# Patient Record
Sex: FEMALE | Race: BLACK OR AFRICAN AMERICAN | Employment: FULL TIME | URBAN - METROPOLITAN AREA
[De-identification: names, ages, dates, MRNs, and addresses within clinical notes are randomized per-mention and may not be internally consistent; named-entity substitution may affect disease eponyms.]

---
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
## 2019-03-20 ENCOUNTER — OFFICE VISIT (OUTPATIENT)
Dept: FAMILY MEDICINE | Facility: CLINIC | Age: 19
End: 2019-03-20

## 2019-03-20 VITALS
HEART RATE: 94 BPM | BODY MASS INDEX: 28.14 KG/M2 | SYSTOLIC BLOOD PRESSURE: 115 MMHG | HEIGHT: 69 IN | OXYGEN SATURATION: 98 % | WEIGHT: 190 LBS | TEMPERATURE: 97.8 F | DIASTOLIC BLOOD PRESSURE: 77 MMHG

## 2019-03-20 DIAGNOSIS — S39.012A STRAIN OF LUMBAR REGION, INITIAL ENCOUNTER: Primary | ICD-10-CM

## 2019-03-20 DIAGNOSIS — M62.830 BACK MUSCLE SPASM: ICD-10-CM

## 2019-03-20 PROCEDURE — 99214 OFFICE O/P EST MOD 30 MIN: CPT | Performed by: PREVENTIVE MEDICINE

## 2019-03-20 RX ORDER — PREDNISONE 20 MG/1
20 TABLET ORAL 2 TIMES DAILY
Qty: 10 TABLET | Refills: 0 | Status: SHIPPED | OUTPATIENT
Start: 2019-03-20 | End: 2019-03-25

## 2019-03-20 RX ORDER — METHOCARBAMOL 500 MG/1
500 TABLET, FILM COATED ORAL 4 TIMES DAILY PRN
Qty: 30 TABLET | Refills: 0 | Status: SHIPPED | OUTPATIENT
Start: 2019-03-20 | End: 2020-05-27

## 2019-03-20 ASSESSMENT — PAIN SCALES - GENERAL: PAINLEVEL: EXTREME PAIN (8)

## 2019-03-20 ASSESSMENT — MIFFLIN-ST. JEOR: SCORE: 1688.27

## 2019-03-20 NOTE — PROGRESS NOTES
SUBJECTIVE:   Lubna Woods is a 18 year old female who presents to clinic today for the following health issues:    Patient states this is a Work related injury    Back Pain       Duration: 1 day ago, 3/19/19        Specific cause: lifting    Description:   Location of pain: low back Middle  Character of pain: sharp, dull ache and constant  Pain radiation:radiates into the right leg and radiates into the left leg  New numbness or weakness in legs, not attributed to pain:  no     Intensity: Currently 8/10    History:   Pain interferes with job: YES,   History of back problems: no prior back problems  Any previous MRI or X-rays: None  Sees a specialist for back pain:  No  Therapies tried without relief: IBU and heat    Alleviating factors:   Improved by: none      Precipitating factors:  Worsened by: Lifting, Bending, Standing, Sitting, Lying Flat, Walking and Coughing    Accompanying Signs & Symptoms:  Risk of Fracture:  None  Risk of Cauda Equina:  None  Risk of Infection:  None  Risk of Cancer:  None  Risk of Ankylosing Spondylitis:  Onset at age <35, male, AND morning back stiffness. no       Last night pain started while at work   Was lifting a bucket off a cart and felt sudden pain  Works in maintenance  Was able to ambulate   More pain with standing   The patient does not have any focal weakness or numbness, no urine or stool incontinence, no hematuria, no saddle anesthesia and no gait abnormalities.     Problem list and histories reviewed & adjusted, as indicated.  Additional history: as documented    Patient Active Problem List   Diagnosis     Dysmenorrhea     Marijuana abuse     Past Surgical History:   Procedure Laterality Date     NO HISTORY OF SURGERY         Social History     Tobacco Use     Smoking status: Never Smoker     Smokeless tobacco: Never Used   Substance Use Topics     Alcohol use: No     Family History   Problem Relation Age of Onset     Asthma Mother      Allergies Mother       "Hypertension Father      Diabetes Father      Unknown/Adopted Maternal Grandfather      Diabetes Paternal Grandmother      Hypertension Paternal Grandmother      Unknown/Adopted Paternal Grandfather          Current Outpatient Medications   Medication Sig Dispense Refill     methocarbamol (ROBAXIN) 500 MG tablet Take 1 tablet (500 mg) by mouth 4 times daily as needed for muscle spasms 30 tablet 0     predniSONE (DELTASONE) 20 MG tablet Take 20 mg by mouth 2 times daily for 5 days. 10 tablet 0     No Known Allergies  BP Readings from Last 3 Encounters:   03/20/19 115/77   07/31/17 115/72   05/11/16 118/60 (72 %/ 19 %)*     *BP percentiles are based on the August 2017 AAP Clinical Practice Guideline for girls    Wt Readings from Last 3 Encounters:   03/20/19 86.2 kg (190 lb)   07/31/17 70.8 kg (156 lb) (86 %)*   05/11/16 68.1 kg (150 lb 3.2 oz) (85 %)*     * Growth percentiles are based on [facility] (Girls, 2-20 Years) data.                  Labs reviewed in EPIC    Reviewed and updated as needed this visit by clinical staff  Tobacco  Allergies  Meds  Problems  Med Hx  Surg Hx  Fam Hx  Soc Hx        Reviewed and updated as needed this visit by Provider  Tobacco  Allergies  Meds  Problems  Med Hx  Surg Hx  Fam Hx         ROS:  Constitutional, neuro, ENT, endocrine, pulmonary, cardiac, gastrointestinal, genitourinary, musculoskeletal, integument and psychiatric systems are negative, except as otherwise noted.    OBJECTIVE:                                                    /77 (BP Location: Left arm, Patient Position: Chair, Cuff Size: Adult Large)   Pulse 94   Temp 97.8  F (36.6  C) (Oral)   Ht 1.74 m (5' 8.5\")   Wt 86.2 kg (190 lb)   SpO2 98%   BMI 28.47 kg/m    Body mass index is 28.47 kg/m .      GENERAL APPEARANCE: healthy, alert  EYES: Eyes grossly normal to inspection and conjunctivae and sclerae normal  HENT: nose and mouth without ulcers or lesions  NECK: no adenopathy and trachea midline " and normal to palpation  RESP: lungs clear to auscultation - no rales, rhonchi or wheezes  CV: regular rates and rhythm, normal S1 S2, no S3 or S4 and no murmur, click or rub  ABDOMEN: soft, non-tender and no rebound or guarding   MS: extremities normal- no gross deformities noted and peripheral pulses normal  SKIN: no suspicious lesions or rashes  NEURO: Normal strength and tone, mentation intact and speech normal  PSYCH: mentation appears normal      Lumbar spine: No swelling, bruising, erythema atrophy or deformity.  Tenderness noted on palpation of spinous processes+.  Range of motion of the lumbar spine is reduced in all directions without focal deficit.  Strength is full.  SLR negative bilaterally.  Distal motor and sensory exam is intact.  Reflexes are 2+ and symmetrical.  Distal pulses intact. Some sacroiliac tenderness bilaterally.  Great toe extension normal.     Diagnostic test results:  Diagnostic Test Results:  No results found for this or any previous visit (from the past 24 hour(s)).     ASSESSMENT/PLAN:                                                    1. Strain of lumbar region, initial encounter  - predniSONE (DELTASONE) 20 MG tablet; Take 20 mg by mouth 2 times daily for 5 days.  Dispense: 10 tablet; Refill: 0  - methocarbamol (ROBAXIN) 500 MG tablet; Take 1 tablet (500 mg) by mouth 4 times daily as needed for muscle spasms  Dispense: 30 tablet; Refill: 0  - [facility]; Future    2. Back muscle spasm  -Work note provided  -sedation side effect of medication reviewed   - methocarbamol (ROBAXIN) 500 MG tablet; Take 1 tablet (500 mg) by mouth 4 times daily as needed for muscle spasms  Dispense: 30 tablet; Refill: 0  - [facility]; Future      Follow up with Provider - 3 days    Will defer Health Maintenance as this is a work related injury    Adele Storm MD MPH    [facility]

## 2019-03-20 NOTE — PATIENT INSTRUCTIONS
================================================================================  Normal Values   Blood pressure  <140/90 for most adults    <130/80 for some chronic diseases (ask your care team about yours)    BMI (body mass index)  18.5-25 kg/m2 (based on height and weight)     Thank you for visiting [facility]    Normal or non-critical lab and imaging results will be communicated to you by MyChart, letter or phone within 7 days.  If you do not hear from us within 10 days, please call the clinic. If you have a critical or abnormal lab result, we will notify you by phone as soon as possible.     If you have any questions regarding your visit please contact:     Team Comfort:   Clinic Hours Telephone Number   Dr. Jude Parr Dr. [place] 7am-5pm  Monday - Friday [phone]  Deon RN  Ivania RN  Shira RN   Pharmacy 8:00am-8pm Monday-Friday    9am-5pm Saturday-Sunday [phone]   Urgent Care 11am-9pm Monday-Friday        9am-5pm Saturday-Sunday [phone]     After hours, weekend or if you need to make an appointment with your primary provider please call [phone].   After Hours nurse advise: call [facility]: [phone]    Medication Refills:  Call your pharmacy and they will forward the refill to us. Please allow 3 business days for your refills to be completed.

## 2019-03-20 NOTE — LETTER
March 20, 2019      Lubna Woods  [address]  [place] [address]        To Whom It May Concern:    Lubna Woods  was seen on 3/20/19.  Please excuse her until 3/23/19 due to injury.        Sincerely,        Adele Storm MD MPH

## 2020-05-06 NOTE — PROGRESS NOTES
"Subjective     Lubna Woods is a 19 year old female who presents to clinic today for the following health issues:    HPI     Vaginal Symptoms      Duration: 2-3 weeks    Description  vaginal discharge and itching    Intensity:  mild    Accompanying signs and symptoms (fever/dysuria/abdominal or back pain): None    History  Sexually active: yes, single partner, contraception - none  Possibility of pregnancy: Don't Know  Recent antibiotic use: no     Precipitating or alleviating factors: None    Therapies tried and outcome: Monistat, Azo   Outcome: Monistat didn't help, Azo helped temporarily    1 time sex with male partner 6 weeks ago. Had regular female partner prior to this.  Did take pregnancy test yesterday and was negative.  Has had some spotting in the last week.    Recent suicide attempt/hospitalization. Tried to overdose on Tylenol. Hospitalized mid-March for 24 hours. No suicidal ideation since then. Doesn't like hospitals and doesn't want to go back. No therapist and mental health provider.      Reviewed and updated as needed this visit by Provider         Review of Systems   ROS COMP: Constitutional, HEENT, cardiovascular, pulmonary, GI, , musculoskeletal, neuro, skin, endocrine and psych systems are negative, except as otherwise noted.      Objective    /70 (BP Location: Right arm, Patient Position: Chair, Cuff Size: Adult Regular)   Pulse 63   Temp 98.5  F (36.9  C) (Oral)   Ht 1.759 m (5' 9.25\")   Wt 76.2 kg (168 lb)   LMP 04/07/2020   SpO2 100%   BMI 24.63 kg/m    Body mass index is 24.63 kg/m .  Physical Exam   GENERAL: healthy, alert and no distress  ABDOMEN: soft, nontender, no hepatosplenomegaly, no masses and bowel sounds normal  PSYCH: mentation appears normal, affect normal/bright    Diagnostic Test Results:  Labs reviewed in Epic  Results for orders placed or performed in visit on 05/07/20 (from the past 24 hour(s))   *UA reflex to Microscopic and Culture ([facility] (except [place] and [place])    Specimen: Midstream Urine   Result Value Ref [facility]    Color Urine Yellow     Appearance Urine Clear     Glucose Urine Negative NEG^Negative mg/dL    Bilirubin Urine Negative NEG^Negative    Ketones Urine Negative NEG^Negative mg/dL    Specific Gravity Urine >1.030 1.003 - 1.035    Blood Urine Trace (A) NEG^Negative    pH Urine 6.5 5.0 - 7.0 pH    Protein Albumin Urine Trace (A) NEG^Negative mg/dL    Urobilinogen Urine 0.2 0.2 - 1.0 EU/dL    Nitrite Urine Negative NEG^Negative    Leukocyte Esterase Urine Large (A) NEG^Negative    Source Midstream Urine    Wet prep    Specimen: Vagina   Result Value Ref [facility]    Specimen Description Vagina     Wet Prep No Trichomonas seen     Wet Prep No yeast seen     Wet Prep No clue cells seen     Wet Prep WBC'S seen     Wet Prep Moderate    HCG Qual, Urine ([id number])   Result Value Ref [facility]    HCG Qual Urine Negative NEG^Negative   Urine Microscopic   Result Value Ref [facility]    WBC Urine 25-50 (A) OTO5^0 - 5 /HPF    RBC Urine 10-25 (A) OTO2^O - 2 /HPF    Squamous Epithelial /LPF Urine Few FEW^Few /LPF    Bacteria Urine Few (A) NEG^Negative /HPF           Assessment & Plan     1. Acute cystitis without hematuria  Start Macrobid, push fluids.   - nitroFURantoin macrocrystal-monohydrate (MACROBID) 100 MG capsule; Take 1 capsule (100 mg) by mouth 2 times daily for 5 days  Dispense: 10 capsule; Refill: 0    2. Unprotected sex  Advised to always use condoms. Not intrerested in contraception as does not plan to be sexually active in near future  - *UA reflex to Microscopic and Culture ([facility] [place] [facility] (except [place] and [place])  - Wet prep  - HCG Qual, Urine ([id number])  - Neisseria gonorrhoeae PCR  - Chlamydia trachomatis PCR  - Urine Microscopic    3. Suicide attempt (H)  Referred to therapy and psychiatry for ongoing management  - MENTAL HEALTH REFERRAL  - Adult; Outpatient Treatment, Psychiatry; Individual/Couples/Family/Group  Therapy/Health Psychology; [facility]: [facility] [phone]; We will contact you to schedule the appointment or please call with any ...    4. Nonspecific finding on examination of urine    - Urine Culture Aerobic Bacterial       See Patient Instructions    Return in about 3 months (around 8/7/2020) for Physical with Pap.    SHEA Vasquez [facility]

## 2020-05-07 ENCOUNTER — OFFICE VISIT (OUTPATIENT)
Dept: FAMILY MEDICINE | Facility: CLINIC | Age: 20
End: 2020-05-07
Payer: COMMERCIAL

## 2020-05-07 VITALS
TEMPERATURE: 98.5 F | HEIGHT: 69 IN | SYSTOLIC BLOOD PRESSURE: 112 MMHG | WEIGHT: 168 LBS | BODY MASS INDEX: 24.88 KG/M2 | DIASTOLIC BLOOD PRESSURE: 70 MMHG | OXYGEN SATURATION: 100 % | HEART RATE: 63 BPM

## 2020-05-07 DIAGNOSIS — Z72.51 UNPROTECTED SEX: ICD-10-CM

## 2020-05-07 DIAGNOSIS — R82.90 NONSPECIFIC FINDING ON EXAMINATION OF URINE: ICD-10-CM

## 2020-05-07 DIAGNOSIS — T14.91XA SUICIDE ATTEMPT (H): ICD-10-CM

## 2020-05-07 DIAGNOSIS — N30.00 ACUTE CYSTITIS WITHOUT HEMATURIA: Primary | ICD-10-CM

## 2020-05-07 LAB
ALBUMIN UR-MCNC: ABNORMAL MG/DL
APPEARANCE UR: CLEAR
BACTERIA #/AREA URNS HPF: ABNORMAL /HPF
BILIRUB UR QL STRIP: NEGATIVE
COLOR UR AUTO: YELLOW
GLUCOSE UR STRIP-MCNC: NEGATIVE MG/DL
HCG UR QL: NEGATIVE
HGB UR QL STRIP: ABNORMAL
KETONES UR STRIP-MCNC: NEGATIVE MG/DL
LEUKOCYTE ESTERASE UR QL STRIP: ABNORMAL
NITRATE UR QL: NEGATIVE
NON-SQ EPI CELLS #/AREA URNS LPF: ABNORMAL /LPF
PH UR STRIP: 6.5 PH (ref 5–7)
RBC #/AREA URNS AUTO: ABNORMAL /HPF
SOURCE: ABNORMAL
SP GR UR STRIP: >1.03 (ref 1–1.03)
SPECIMEN SOURCE: NORMAL
UROBILINOGEN UR STRIP-ACNC: 0.2 EU/DL (ref 0.2–1)
WBC #/AREA URNS AUTO: ABNORMAL /HPF
WET PREP SPEC: NORMAL

## 2020-05-07 PROCEDURE — 87088 URINE BACTERIA CULTURE: CPT | Performed by: NURSE PRACTITIONER

## 2020-05-07 PROCEDURE — 87086 URINE CULTURE/COLONY COUNT: CPT | Performed by: NURSE PRACTITIONER

## 2020-05-07 PROCEDURE — 87591 N.GONORRHOEAE DNA AMP PROB: CPT | Performed by: NURSE PRACTITIONER

## 2020-05-07 PROCEDURE — 87491 CHLMYD TRACH DNA AMP PROBE: CPT | Performed by: NURSE PRACTITIONER

## 2020-05-07 PROCEDURE — 81025 URINE PREGNANCY TEST: CPT | Performed by: NURSE PRACTITIONER

## 2020-05-07 PROCEDURE — 81001 URINALYSIS AUTO W/SCOPE: CPT | Performed by: NURSE PRACTITIONER

## 2020-05-07 PROCEDURE — 87210 SMEAR WET MOUNT SALINE/INK: CPT | Performed by: NURSE PRACTITIONER

## 2020-05-07 PROCEDURE — 99214 OFFICE O/P EST MOD 30 MIN: CPT | Performed by: NURSE PRACTITIONER

## 2020-05-07 RX ORDER — NITROFURANTOIN 25; 75 MG/1; MG/1
100 CAPSULE ORAL 2 TIMES DAILY
Qty: 10 CAPSULE | Refills: 0 | Status: SHIPPED | OUTPATIENT
Start: 2020-05-07 | End: 2020-05-08 | Stop reason: ALTCHOICE

## 2020-05-07 ASSESSMENT — MIFFLIN-ST. JEOR: SCORE: 1595.38

## 2020-05-08 LAB
BACTERIA SPEC CULT: ABNORMAL
C TRACH DNA SPEC QL NAA+PROBE: NEGATIVE
N GONORRHOEA DNA SPEC QL NAA+PROBE: NEGATIVE
SPECIMEN SOURCE: ABNORMAL
SPECIMEN SOURCE: NORMAL
SPECIMEN SOURCE: NORMAL

## 2020-05-08 RX ORDER — PENICILLIN V POTASSIUM 500 MG/1
500 TABLET, FILM COATED ORAL 2 TIMES DAILY
Qty: 10 TABLET | Refills: 0 | Status: SHIPPED | OUTPATIENT
Start: 2020-05-08 | End: 2020-05-27

## 2020-05-08 NOTE — RESULT ENCOUNTER NOTE
Please call the patient with these results:    -Urine culture is abnormal and grew out bacteria that are NOT sensitive to the antibiotic you have been given.   I have sent a new antibiotic to your pharmacy to replace the previous antibiotic.   Pia Topete, CNP

## 2020-05-14 ENCOUNTER — VIRTUAL VISIT (OUTPATIENT)
Dept: PSYCHOLOGY | Facility: CLINIC | Age: 20
End: 2020-05-14
Attending: NURSE PRACTITIONER
Payer: COMMERCIAL

## 2020-05-14 DIAGNOSIS — F41.8 OTHER SPECIFIED ANXIETY DISORDERS: Primary | ICD-10-CM

## 2020-05-14 DIAGNOSIS — F32.89 OTHER SPECIFIED DEPRESSIVE EPISODES: ICD-10-CM

## 2020-05-14 PROCEDURE — 90791 PSYCH DIAGNOSTIC EVALUATION: CPT | Mod: 95 | Performed by: SOCIAL WORKER

## 2020-05-14 ASSESSMENT — COLUMBIA-SUICIDE SEVERITY RATING SCALE - C-SSRS
ATTEMPT LIFETIME: YES
5. HAVE YOU STARTED TO WORK OUT OR WORKED OUT THE DETAILS OF HOW TO KILL YOURSELF? DO YOU INTEND TO CARRY OUT THIS PLAN?: YES
1. IN THE PAST MONTH, HAVE YOU WISHED YOU WERE DEAD OR WISHED YOU COULD GO TO SLEEP AND NOT WAKE UP?: NO
REASONS FOR IDEATION LIFETIME: COMPLETELY TO END OR STOP THE PAIN (YOU COULDN'T GO ON LIVING WITH THE PAIN OR HOW YOU WERE FEELING)
LETHALITY/MEDICAL DAMAGE CODE MOST RECENT ACTUAL ATTEMPT: NO PHYSICAL DAMAGE OR VERY MINOR PHYSICAL DAMAGE
6. HAVE YOU EVER DONE ANYTHING, STARTED TO DO ANYTHING, OR PREPARED TO DO ANYTHING TO END YOUR LIFE?: YES
TOTAL  NUMBER OF ABORTED OR SELF INTERRUPTED ATTEMPTS PAST LIFETIME: NO
TOTAL  NUMBER OF INTERRUPTED ATTEMPTS LIFETIME: YES
4. HAVE YOU HAD THESE THOUGHTS AND HAD SOME INTENTION OF ACTING ON THEM?: NO
3. HAVE YOU BEEN THINKING ABOUT HOW YOU MIGHT KILL YOURSELF?: YES
5. HAVE YOU STARTED TO WORK OUT OR WORKED OUT THE DETAILS OF HOW TO KILL YOURSELF? DO YOU INTEND TO CARRY OUT THIS PLAN?: NO
2. HAVE YOU ACTUALLY HAD ANY THOUGHTS OF KILLING YOURSELF LIFETIME?: YES
1. IN THE PAST MONTH, HAVE YOU WISHED YOU WERE DEAD OR WISHED YOU COULD GO TO SLEEP AND NOT WAKE UP?: YES
ATTEMPT PAST THREE MONTHS: YES
4. HAVE YOU HAD THESE THOUGHTS AND HAD SOME INTENTION OF ACTING ON THEM?: YES
6. HAVE YOU EVER DONE ANYTHING, STARTED TO DO ANYTHING, OR PREPARED TO DO ANYTHING TO END YOUR LIFE?: YES

## 2020-05-14 ASSESSMENT — PATIENT HEALTH QUESTIONNAIRE - PHQ9: SUM OF ALL RESPONSES TO PHQ QUESTIONS 1-9: 8

## 2020-05-14 ASSESSMENT — ANXIETY QUESTIONNAIRES
5. BEING SO RESTLESS THAT IT IS HARD TO SIT STILL: NOT AT ALL
IF YOU CHECKED OFF ANY PROBLEMS ON THIS QUESTIONNAIRE, HOW DIFFICULT HAVE THESE PROBLEMS MADE IT FOR YOU TO DO YOUR WORK, TAKE CARE OF THINGS AT HOME, OR GET ALONG WITH OTHER PEOPLE: VERY DIFFICULT
4. TROUBLE RELAXING: SEVERAL DAYS
1. FEELING NERVOUS, ANXIOUS, OR ON EDGE: MORE THAN HALF THE DAYS
7. FEELING AFRAID AS IF SOMETHING AWFUL MIGHT HAPPEN: MORE THAN HALF THE DAYS
6. BECOMING EASILY ANNOYED OR IRRITABLE: SEVERAL DAYS
2. NOT BEING ABLE TO STOP OR CONTROL WORRYING: NEARLY EVERY DAY
GAD7 TOTAL SCORE: 12
3. WORRYING TOO MUCH ABOUT DIFFERENT THINGS: NEARLY EVERY DAY

## 2020-05-14 NOTE — Clinical Note
Hello,  I am just passing this along to inform you this patient began therapy with me today. Feel free to reach out with any questions or thoughts to coordinate care.  Thanks! Daniela
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
## 2020-05-15 ASSESSMENT — ANXIETY QUESTIONNAIRES: GAD7 TOTAL SCORE: 12

## 2020-05-25 ENCOUNTER — NURSE TRIAGE (OUTPATIENT)
Dept: NURSING | Facility: CLINIC | Age: 20
End: 2020-05-25
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
## 2020-05-26 ENCOUNTER — VIRTUAL VISIT (OUTPATIENT)
Dept: FAMILY MEDICINE | Facility: CLINIC | Age: 20
End: 2020-05-26
Payer: COMMERCIAL

## 2020-05-26 DIAGNOSIS — R21 VULVAR RASH: Primary | ICD-10-CM

## 2020-05-26 PROCEDURE — 99207 ZZC NON-BILLABLE SERV PER CHARTING: CPT | Performed by: PHYSICIAN ASSISTANT

## 2020-05-26 NOTE — PROGRESS NOTES
Subjective     Lubna Woods is a 19 year old female who presents to clinic today for the following health issues:    HPI   Rash      Duration: 3-4 weeks    Description  Location: vaginal  Itching: irritated    Intensity:  mild    Accompanying signs and symptoms: vaginal order    History (similar episodes/previous evaluation): was seen 5/7/2020    Precipitating or alleviating factors:  New exposures:  None  Recent travel: no      Therapies tried and outcome: was on antibiotic       Patient Active Problem List   Diagnosis     Dysmenorrhea     Marijuana abuse     Suicide attempt (H)     Past Surgical History:   Procedure Laterality Date     NO HISTORY OF SURGERY         Social History     Tobacco Use     Smoking status: Never Smoker     Smokeless tobacco: Never Used   Substance Use Topics     Alcohol use: No     Family History   Problem Relation Age of Onset     Asthma Mother      Allergies Mother      Hypertension Father      Diabetes Father      Unknown/Adopted Maternal Grandfather      Diabetes Paternal Grandmother      Hypertension Paternal Grandmother      Unknown/Adopted Paternal Grandfather          Current Outpatient Medications   Medication Sig Dispense Refill     ibuprofen (ADVIL/MOTRIN) 400 MG tablet Take 1 tablet (400 mg) by mouth every 6 hours as needed for moderate pain 30 tablet 2     metroNIDAZOLE (FLAGYL) 500 MG tablet Take 1 tablet (500 mg) by mouth 2 times daily for 7 days 14 tablet 0     No Known Allergies  BP Readings from Last 3 Encounters:   05/27/20 114/72   05/07/20 112/70   03/20/19 115/77    Wt Readings from Last 3 Encounters:   05/27/20 75.3 kg (166 lb)   05/07/20 76.2 kg (168 lb)   03/20/19 86.2 kg (190 lb)                      Reviewed and updated as needed this visit by Provider         Review of Systems   Constitutional, HEENT, cardiovascular, pulmonary, gi and gu systems are negative, except as otherwise noted.      Objective    There were no vitals taken for this visit.  There is no  height or weight on file to calculate BMI.  Physical Exam   GENERAL: healthy, alert and no distress   (female): normal female external genitalia, normal urethral meatus , vaginal discharge - moderate, brown and watery and normal cervix, adnexae, and uterus without masses.  MS: no gross musculoskeletal defects noted, no edema    Diagnostic Test Results:  Results for orders placed or performed in visit on 05/27/20 (from the past 24 hour(s))   Wet prep    Specimen: Vagina   Result Value Ref Range    Specimen Description Vagina     Wet Prep Trichomonas seen (A)     Wet Prep No clue cells seen     Wet Prep No yeast seen     Wet Prep Few  WBC'S seen              Assessment & Plan     1. Vaginal discharge    - Wet prep    2. Trichomonas infection  Patient to notify her partner.  - metroNIDAZOLE (FLAGYL) 500 MG tablet; Take 1 tablet (500 mg) by mouth 2 times daily for 7 days  Dispense: 14 tablet; Refill: 0    3. Dysmenorrhea  Patient requests ibuprofen for her menstrual cramps.  - ibuprofen (ADVIL/MOTRIN) 400 MG tablet; Take 1 tablet (400 mg) by mouth every 6 hours as needed for moderate pain  Dispense: 30 tablet; Refill: 2           Return in about 3 months (around 8/27/2020) for Physical Exam, pap.    SHEA Alonzo [facility]

## 2020-05-26 NOTE — PROGRESS NOTES
"Lubna Woods is a 19 year old female who is being evaluated via a billable video visit.      The patient has been notified of following:     \"This video visit will be conducted via a call between you and your physician/provider. We have found that certain health care needs can be provided without the need for an in-person physical exam.  This service lets us provide the care you need with a video conversation.  If a prescription is necessary we can send it directly to your pharmacy.  If lab work is needed we can place an order for that and you can then stop by our lab to have the test done at a later time.    Video visits are billed at different rates depending on your insurance coverage.  Please reach out to your insurance provider with any questions.    If during the course of the call the physician/provider feels a video visit is not appropriate, you will not be charged for this service.\"    Patient has given verbal consent for Video visit? Yes    How would you like to obtain your AVS? Mail a copy    Patient would like the video invitation sent by: Send to e-mail at: [email or web address]    Will anyone else be joining your video visit? No      Subjective     Lubna Woods is a 19 year old female who presents today via video visit for the following health issues:    HPI   Patient was seen by Pia Topete in office on 5/7/2020 she was prescribed penicillin she states, \"the medication is gone, but the symptoms are still present.\"       Reviewed labs with Patient. She now has noticed a rash on her perineum.  No h/o HSV.  She is amenablento in clinic follow up.   Review of Systems         Video-Visit Details    Type of service:  Video Visit    Video  Time:5 minutes    Originating Location (pt. Location): Home    Distant Location (provider location):  [facility]     Platform used for Video Visit: Kathi Martinez PA-C      "

## 2020-05-27 ENCOUNTER — OFFICE VISIT (OUTPATIENT)
Dept: FAMILY MEDICINE | Facility: CLINIC | Age: 20
End: 2020-05-27
Payer: COMMERCIAL

## 2020-05-27 VITALS
RESPIRATION RATE: 14 BRPM | WEIGHT: 166 LBS | HEART RATE: 58 BPM | DIASTOLIC BLOOD PRESSURE: 72 MMHG | BODY MASS INDEX: 24.34 KG/M2 | TEMPERATURE: 98.5 F | SYSTOLIC BLOOD PRESSURE: 114 MMHG | OXYGEN SATURATION: 100 %

## 2020-05-27 DIAGNOSIS — N89.8 VAGINAL DISCHARGE: Primary | ICD-10-CM

## 2020-05-27 DIAGNOSIS — A59.9 TRICHOMONAS INFECTION: ICD-10-CM

## 2020-05-27 DIAGNOSIS — N94.6 DYSMENORRHEA: ICD-10-CM

## 2020-05-27 LAB
SPECIMEN SOURCE: ABNORMAL
WET PREP SPEC: ABNORMAL

## 2020-05-27 PROCEDURE — 87210 SMEAR WET MOUNT SALINE/INK: CPT | Performed by: NURSE PRACTITIONER

## 2020-05-27 PROCEDURE — 99213 OFFICE O/P EST LOW 20 MIN: CPT | Performed by: NURSE PRACTITIONER

## 2020-05-27 RX ORDER — METRONIDAZOLE 500 MG/1
500 TABLET ORAL 2 TIMES DAILY
Qty: 14 TABLET | Refills: 0 | Status: SHIPPED | OUTPATIENT
Start: 2020-05-27 | End: 2020-06-30

## 2020-05-27 RX ORDER — IBUPROFEN 400 MG/1
400 TABLET, FILM COATED ORAL EVERY 6 HOURS PRN
Qty: 30 TABLET | Refills: 2 | Status: SHIPPED | OUTPATIENT
Start: 2020-05-27 | End: 2021-02-12

## 2020-06-05 ENCOUNTER — TELEPHONE (OUTPATIENT)
Dept: PSYCHOLOGY | Facility: CLINIC | Age: 20
End: 2020-06-05

## 2020-06-05 NOTE — TELEPHONE ENCOUNTER
Therapist reached out to client due to her not connecting to video visit and LVM informing her of efforts to connect for appointment. Therapist called back 5 minutes later and was able to speak to client. She stated that she is not feeling well and would like to cancel her appointment. She chose to re-schedule for a month out. She denied any serious mental health concerns or SI at this time and agreed to meet in one month.     JADYN Dill

## 2020-06-30 ENCOUNTER — OFFICE VISIT (OUTPATIENT)
Dept: FAMILY MEDICINE | Facility: CLINIC | Age: 20
End: 2020-06-30
Payer: COMMERCIAL

## 2020-06-30 ENCOUNTER — NURSE TRIAGE (OUTPATIENT)
Dept: FAMILY MEDICINE | Facility: CLINIC | Age: 20
End: 2020-06-30

## 2020-06-30 VITALS
SYSTOLIC BLOOD PRESSURE: 98 MMHG | WEIGHT: 167 LBS | DIASTOLIC BLOOD PRESSURE: 50 MMHG | RESPIRATION RATE: 14 BRPM | HEIGHT: 69 IN | OXYGEN SATURATION: 99 % | BODY MASS INDEX: 24.73 KG/M2 | HEART RATE: 68 BPM | TEMPERATURE: 97.1 F

## 2020-06-30 DIAGNOSIS — A59.9 TRICHOMONAS INFECTION: Primary | ICD-10-CM

## 2020-06-30 PROBLEM — T14.91XA SUICIDE ATTEMPT (H): Status: RESOLVED | Noted: 2020-05-07 | Resolved: 2020-06-30

## 2020-06-30 LAB
HIV 1+2 AB+HIV1 P24 AG SERPL QL IA: NONREACTIVE
SPECIMEN SOURCE: ABNORMAL
WET PREP SPEC: ABNORMAL

## 2020-06-30 PROCEDURE — 87491 CHLMYD TRACH DNA AMP PROBE: CPT | Performed by: FAMILY MEDICINE

## 2020-06-30 PROCEDURE — 99213 OFFICE O/P EST LOW 20 MIN: CPT | Performed by: FAMILY MEDICINE

## 2020-06-30 PROCEDURE — 87591 N.GONORRHOEAE DNA AMP PROB: CPT | Performed by: FAMILY MEDICINE

## 2020-06-30 PROCEDURE — 36415 COLL VENOUS BLD VENIPUNCTURE: CPT | Performed by: FAMILY MEDICINE

## 2020-06-30 PROCEDURE — 87210 SMEAR WET MOUNT SALINE/INK: CPT | Performed by: FAMILY MEDICINE

## 2020-06-30 PROCEDURE — 87389 HIV-1 AG W/HIV-1&-2 AB AG IA: CPT | Performed by: FAMILY MEDICINE

## 2020-06-30 RX ORDER — METRONIDAZOLE 500 MG/1
500 TABLET ORAL 2 TIMES DAILY
Qty: 14 TABLET | Refills: 0 | Status: SHIPPED | OUTPATIENT
Start: 2020-06-30 | End: 2020-07-15

## 2020-06-30 ASSESSMENT — MIFFLIN-ST. JEOR: SCORE: 1590.85

## 2020-06-30 NOTE — TELEPHONE ENCOUNTER
Spoke with pt. Symptoms improved, but didn't clear it up. Still has vaginal discharge that is light tan in color and is runny. Has an odor, but says she can't explain this. Is itchy from time to time. No abdominal pain. Pt is frustrated because this is the 3rd time she got this and it won't go away. States she has not had sex since she had it the last time. Recommended an appt. Appt scheduled.    Maria C Oconnell RN  [facility]

## 2020-06-30 NOTE — TELEPHONE ENCOUNTER
Reason for call:  Symptom   Symptom or request: STD follow up    Duration (how long have symptoms been present): 1 month  Have you been treated for this before? Yes    Additional comments: Patient is still having discharge and itching, please call.    Phone number to reach patient:  Cell number on file:    Telephone Information:   Mobile [phone]       Best Time:  any    Can we leave a detailed message on this number?  YES    Travel screening: Not Applicable

## 2020-06-30 NOTE — PATIENT INSTRUCTIONS
Patient Education     What Are Sexually Transmitted Diseases (STDs)?  A sexually transmitted disease (STD) is a disease that is spread during sex. (An STD can also be called STI for sexually transmitted infection.) You can become infected with an STD if you have sex with someone who has an STD. Any sex that involves the penis, vagina, anus, or mouth can spread disease. Some STDs spread through body fluids such as semen, vaginal fluid, or blood. Others spread through contact with affected skin.  Who is at risk?     Places on or in the body where STDs cause damage include reproductive organs, the rectum, and the mouth.   It doesn t matter if you re straight or machado, male or female, young or old. Any person who has sex can get an STD. Your risk increases if:    You have more than one partner. The more partners you have, the greater your risk.    Your partner has other partners. If your partner is exposed to an STD, you could be, too.    You or your partner have had sex with other people in the past. Either of you might be carrying an STD from an earlier partner.    You have an STD. The STD may cause sores or other health problems that increase your risk of new infections. Your risk will stay high unless you change the behaviors that put you at risk of the current infection.  Prevent future problems  Left untreated, certain STDs can lead to cancer or even death. Some can harm unborn babies whose mothers are infected. Others can cause you to not be able to have children (sterility) or can affect changes in behavior or your ability to think. You can prevent these problems with safer sex, regular checkups, and early treatment. Always use a latex condom when you have sex. Get tested if you re at risk. And get treated early if you have an STD.  Getting checked  The only sure way to know if you have an STD is to get checked by a healthcare provider. If you notice a change in how your body looks or feels, have it checked out.  But keep in mind, STDs don t always show symptoms. So if you re at risk of STDs, get checked regularly. If you find you have an STD, be sure your partner gets treatment, too. If not, his or her health is at risk. And left untreated, your partner could pass the STD back to you, or on to others.  Common symptoms  Be alert to any changes in your body and your partner s body. Symptoms may appear in or near the vagina, penis, rectum, mouth, or throat. They include:    Unusual discharge    Lumps, bumps, or rashes    Sores that may be painful, itchy, or painless    Itchy skin    Burning with urination    Pain in the pelvis, belly (abdomen), or rectum  Even if you don t have symptoms  You may have an STD, even if you don t have symptoms. If you think you are at risk, get checked. Go to a clinic or to your healthcare provider. If your partner has an STD, you need to be tested too, even if you feel fine.  Vaccines to prevent disease  Vaccines (also called immunizations) are available to prevent hepatitis A and hepatitis B. These are two kinds of STDs. There is also a vaccine to prevent HPV. This is a virus that can be passed from person to person through sexual contact. Ask your healthcare provider whether any of these vaccines is right for you.   Date Last Reviewed: 11/1/2016 2000-2019 [employer]. [address] [place] [address], [place] [address]. All rights reserved. This information is not intended as a substitute for professional medical care. Always follow your healthcare professional's instructions.

## 2020-06-30 NOTE — LETTER
July 2, 2020      Lubna Woods  [address]  [facility] [address]          Dear ,    We are writing to inform you of your test results.  Your HIV, gonorrhea, and Chlamydia tests are normal. As we dicussed in clinic, you have trichomonas. This has been treated. Call or return to clinic as needed if these symptoms worsen or fail to improve as anticipated.       Resulted Orders   Wet prep   Result Value Ref Range    Specimen Description Vagina     Wet Prep Trichomonas seen  Few   (A)     Wet Prep No clue cells seen     Wet Prep No yeast seen     Wet Prep Moderate  WBC'S seen      HIV Antigen Antibody Combo   Result Value Ref Range    HIV Antigen Antibody Combo Nonreactive NR^Nonreactive          Comment:      HIV-1 p24 Ag & HIV-1/HIV-2 Ab Not Detected   Neisseria gonorrhoeae PCR   Result Value Ref Range    Specimen Descrip Vagina     N Gonorrhea PCR Negative NEG^Negative      Comment:      Negative for N. gonorrhoeae rRNA by transcription mediated amplification.  A negative result by transcription mediated amplification does not preclude   the presence of N. gonorrhoeae infection because results are dependent on   proper and adequate collection, absence of inhibitors, and sufficient rRNA to   be detected.     Chlamydia trachomatis PCR   Result Value Ref Range    Specimen Description Vagina     Chlamydia Trachomatis PCR Negative NEG^Negative      Comment:      Negative for C. trachomatis rRNA by transcription mediated amplification.  A negative result by transcription mediated amplification does not preclude   the presence of C. trachomatis infection because results are dependent on   proper and adequate collection, absence of inhibitors, and sufficient rRNA to   be detected.         If you have any questions or concerns, please call the clinic at the number listed above.       Sincerely,        Whitley Segura MD

## 2020-06-30 NOTE — PROGRESS NOTES
"Ning Woods is a 19 year old female who presents to clinic today for the following health issues:    HPI   Vaginal Symptoms  Onset: Since March    Description:  Vaginal Discharge: creamy yellowish   Itching (Pruritis): YES- Slight  Burning sensation:  no   Odor: YES    Accompanying Signs & Symptoms:  Pain with Urination: no   Abdominal Pain: no   Fever: no     History:   Sexually active: no   New Partner: no   Possibility of Pregnancy:  No    Precipitating factors:   Recent Antibiotic Use: YES    Alleviating factors:      Therapies Tried and outcome: previous treatments for trichomonas         Reviewed and updated as needed this visit by Provider  Tobacco  Allergies  Meds  Problems  Med Hx  Surg Hx  Fam Hx         Review of Systems   CONSTITUTIONAL: NEGATIVE for fever, chills, change in weight  RESP: NEGATIVE for significant cough or SOB   female: vaginal discharge        Objective    BP 98/50   Pulse 68   Temp 97.1  F (36.2  C) (Oral)   Resp 14   Ht 1.759 m (5' 9.25\")   Wt 75.8 kg (167 lb)   LMP 06/02/2020   SpO2 99%   Breastfeeding No   BMI 24.48 kg/m    Body mass index is 24.48 kg/m .  Physical Exam   GENERAL: healthy, alert and no distress  MS: extremities normal- no gross deformities noted  PSYCH: mentation appears normal, affect normal/bright    Diagnostic Test Results:  Labs reviewed in Epic  Results for orders placed or performed in visit on 06/30/20 (from the past 24 hour(s))   Wet prep    Specimen: Vagina   Result Value Ref Range    Specimen Description Vagina     Wet Prep Trichomonas seen  Few   (A)     Wet Prep No clue cells seen     Wet Prep No yeast seen     Wet Prep Moderate  WBC'S seen              Assessment & Plan     (A59.9) Trichomonas infection  (primary encounter diagnosis)  Comment: persistent infection   Plan: HIV Antigen Antibody Combo, metroNIDAZOLE         (FLAGYL) 500 MG tablet, Neisseria gonorrhoeae         PCR, Chlamydia trachomatis PCR        " Discussed risks and benefits of this medication. Discussed safe sex practices and follow-up for AFE.          See Patient Instructions    Return in about 1 month (around 7/30/2020) for physical.    Whitley Segura MD  [facility]

## 2020-06-30 NOTE — TELEPHONE ENCOUNTER
Additional Information    Negative: Pain or burning with passing urine (urination) is main symptom    Negative: Pregnant with vaginal discharge    Negative: SEVERE abdominal pain (e.g., excruciating)    Negative: Patient sounds very sick or weak to the triager    Negative: Yellow or green vaginal discharge and has a fever    Negative: Constant abdominal pain lasting > 2 hours    Bad smelling vaginal discharge    Negative: Rash (e.g., redness, tiny bumps, sore) of genital area present > 24 hours    Negative: Mild lower abdominal pain comes and goes (cramps) that lasts > 24 hours    Negative: Genital area looks infected (e.g., draining sore, spreading redness)    Negative: Rash is tiny water blisters (3 or more)    Negative: Patient wants to be seen    Protocols used: VAGINAL [id number]

## 2020-07-01 LAB
C TRACH DNA SPEC QL NAA+PROBE: NEGATIVE
N GONORRHOEA DNA SPEC QL NAA+PROBE: NEGATIVE
SPECIMEN SOURCE: NORMAL
SPECIMEN SOURCE: NORMAL

## 2020-07-01 NOTE — RESULT ENCOUNTER NOTE
Mail letter:    Your HIV, gonorrhea, and Chlamydia tests are normal. As we dicussed in clinic, you have trichomonas. This has been treated. Call or return to clinic as needed if these symptoms worsen or fail to improve as anticipated.     Whitley Segura MD

## 2020-07-08 ENCOUNTER — VIRTUAL VISIT (OUTPATIENT)
Dept: PSYCHOLOGY | Facility: CLINIC | Age: 20
End: 2020-07-08
Payer: COMMERCIAL

## 2020-07-08 DIAGNOSIS — F32.89 OTHER SPECIFIED DEPRESSIVE EPISODES: ICD-10-CM

## 2020-07-08 DIAGNOSIS — F41.8 OTHER SPECIFIED ANXIETY DISORDERS: Primary | ICD-10-CM

## 2020-07-08 PROCEDURE — 90834 PSYTX W PT 45 MINUTES: CPT | Mod: 95 | Performed by: SOCIAL WORKER

## 2020-07-08 ASSESSMENT — ANXIETY QUESTIONNAIRES
GAD7 TOTAL SCORE: 12
3. WORRYING TOO MUCH ABOUT DIFFERENT THINGS: MORE THAN HALF THE DAYS
7. FEELING AFRAID AS IF SOMETHING AWFUL MIGHT HAPPEN: SEVERAL DAYS
5. BEING SO RESTLESS THAT IT IS HARD TO SIT STILL: NOT AT ALL
1. FEELING NERVOUS, ANXIOUS, OR ON EDGE: MORE THAN HALF THE DAYS
4. TROUBLE RELAXING: MORE THAN HALF THE DAYS
2. NOT BEING ABLE TO STOP OR CONTROL WORRYING: MORE THAN HALF THE DAYS
6. BECOMING EASILY ANNOYED OR IRRITABLE: NEARLY EVERY DAY

## 2020-07-08 ASSESSMENT — PATIENT HEALTH QUESTIONNAIRE - PHQ9: SUM OF ALL RESPONSES TO PHQ QUESTIONS 1-9: 8
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
## 2020-07-09 ASSESSMENT — ANXIETY QUESTIONNAIRES: GAD7 TOTAL SCORE: 12

## 2020-07-15 ENCOUNTER — TELEPHONE (OUTPATIENT)
Dept: FAMILY MEDICINE | Facility: CLINIC | Age: 20
End: 2020-07-15

## 2020-07-15 DIAGNOSIS — A59.9 TRICHOMONAS INFECTION: ICD-10-CM

## 2020-07-15 RX ORDER — METRONIDAZOLE 500 MG/1
500 TABLET ORAL 2 TIMES DAILY
Qty: 14 TABLET | Refills: 0 | Status: SHIPPED | OUTPATIENT
Start: 2020-07-15 | End: 2020-09-02

## 2020-07-15 NOTE — TELEPHONE ENCOUNTER
Patient said that she tried flagyl 500 mg for 3 times and still not working for her. She would like a different medication. She tried calling clinic to speak to a nurse , but they asked her to call the pharmacy.     Thank you   Denia Roberson. Pharmacy Technician   [facility]

## 2020-07-15 NOTE — TELEPHONE ENCOUNTER
Unintentionally closed encounter - call patient:    I recommend first retreating with metro 500 mg BID x 7 days - call me if still having symptoms to switch to metro 2 g every day x 3 days.     Your partner(s) should be treated with metro 2 g x 1 - send me name, , and drug allergies and I can call in     Whitley Segura MD    Signed Prescriptions:                        Disp   Refills    metroNIDAZOLE (FLAGYL) 500 MG tablet       14 tab*0        Sig: Take 1 tablet (500 mg) by mouth 2 times daily  Authorizing Provider: WHITLEY SEGURA

## 2020-07-15 NOTE — TELEPHONE ENCOUNTER
Called patient and notified her of message. She verbalized understanding to provider reply, will  and start rx and call if symptoms are not improving. Pt states that she does not have any partners currently/not sexually active. Pt also states that she does not do any vaginal douching.

## 2020-07-21 ENCOUNTER — TELEPHONE (OUTPATIENT)
Dept: PSYCHOLOGY | Facility: CLINIC | Age: 20
End: 2020-07-21

## 2020-07-21 NOTE — TELEPHONE ENCOUNTER
Therapist contacted client for session due to her not connecting to video. She answered and shared that she is at work and cannot attend the session now. Therapist reminded her to cancel 24 hours in advance and sent message with number. Client agreed she should be able to attend next scheduled session.    JADYN Dill

## 2020-07-23 ENCOUNTER — TELEPHONE (OUTPATIENT)
Dept: FAMILY MEDICINE | Facility: CLINIC | Age: 20
End: 2020-07-23

## 2020-07-23 DIAGNOSIS — A59.01 TRICHOMONAS VAGINITIS: Primary | ICD-10-CM

## 2020-07-23 RX ORDER — METRONIDAZOLE 500 MG/1
2000 TABLET ORAL DAILY
Qty: 28 TABLET | Refills: 0 | Status: SHIPPED | OUTPATIENT
Start: 2020-07-23 | End: 2020-07-30

## 2020-07-23 NOTE — TELEPHONE ENCOUNTER
Reason for Call:  Other prescription    Detailed comments: Patient would like an alternative medication for metroNIDAZOLE (FLAGYL) 500 MG. Patient states that it hasn't helped with her symptoms.     Phone Number Patient can be reached at: Cell number on file:    Telephone Information:   Mobile [phone]       Best Time: asap    Can we leave a detailed message on this number? YES    Call taken on 7/23/2020 at 8:15 AM by Elinor Robin

## 2020-07-23 NOTE — TELEPHONE ENCOUNTER
Signed Prescriptions:                        Disp   Refills    metroNIDAZOLE (FLAGYL) 500 MG tablet       28 tab*0        Sig: Take 4 tablets (2,000 mg) by mouth daily for 7 days  Authorizing Provider: SHAHANA HERNANDEZ    Call or return to clinic as needed if these symptoms worsen or fail to improve as anticipated.

## 2020-07-23 NOTE — TELEPHONE ENCOUNTER
Patient notified of providers message as written.  Patient verbalized understanding, no further questions or concerns.    Tran Thorpe RN

## 2020-07-23 NOTE — TELEPHONE ENCOUNTER
Patient had lab work done on 6/30 and was diagnosed with Trichomonas.  She was given Flagyl for 7 days and told to call back if symptoms continued and Flagyl 2g daily for 3 days would be sent.   Patient is still having abnormal vaginal discharge and would like medication.  Order pending.   Tran Thorpe RN

## 2020-08-04 ENCOUNTER — VIRTUAL VISIT (OUTPATIENT)
Dept: PSYCHOLOGY | Facility: CLINIC | Age: 20
End: 2020-08-04
Payer: COMMERCIAL

## 2020-08-04 DIAGNOSIS — F41.8 OTHER SPECIFIED ANXIETY DISORDERS: Primary | ICD-10-CM

## 2020-08-04 DIAGNOSIS — F32.89 OTHER SPECIFIED DEPRESSIVE EPISODES: ICD-10-CM

## 2020-08-04 PROCEDURE — 90834 PSYTX W PT 45 MINUTES: CPT | Mod: 95 | Performed by: SOCIAL WORKER

## 2020-08-04 ASSESSMENT — ANXIETY QUESTIONNAIRES
4. TROUBLE RELAXING: MORE THAN HALF THE DAYS
GAD7 TOTAL SCORE: 15
6. BECOMING EASILY ANNOYED OR IRRITABLE: NEARLY EVERY DAY
1. FEELING NERVOUS, ANXIOUS, OR ON EDGE: NOT AT ALL
5. BEING SO RESTLESS THAT IT IS HARD TO SIT STILL: MORE THAN HALF THE DAYS
2. NOT BEING ABLE TO STOP OR CONTROL WORRYING: NEARLY EVERY DAY
7. FEELING AFRAID AS IF SOMETHING AWFUL MIGHT HAPPEN: MORE THAN HALF THE DAYS
3. WORRYING TOO MUCH ABOUT DIFFERENT THINGS: NEARLY EVERY DAY

## 2020-08-04 ASSESSMENT — PATIENT HEALTH QUESTIONNAIRE - PHQ9: SUM OF ALL RESPONSES TO PHQ QUESTIONS 1-9: 10
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
## 2020-08-05 ASSESSMENT — ANXIETY QUESTIONNAIRES: GAD7 TOTAL SCORE: 15

## 2020-08-06 ENCOUNTER — TELEPHONE (OUTPATIENT)
Dept: FAMILY MEDICINE | Facility: CLINIC | Age: 20
End: 2020-08-06

## 2020-08-06 NOTE — TELEPHONE ENCOUNTER
Left message on answering machine for patient to call back to the RN hotline at [phone].    Maria C Oconnell RN  [facility]

## 2020-08-06 NOTE — TELEPHONE ENCOUNTER
Reason for Call:  Other prescription    Detailed comments: Patient states that medication prescribed for vaginal discharge hasn't been working. Patient was seen 6/30/2020. Patient wants an alternative medication.    Phone Number Patient can be reached at: Cell number on file:    Telephone Information:   Mobile [phone]       Best Time: any    Can we leave a detailed message on this number? YES    Call taken on 8/6/2020 at 12:19 PM by Elinor Robin

## 2020-08-06 NOTE — TELEPHONE ENCOUNTER
Spoke with pt. States she is having vaginal Itching and discharge. Discharge is white-pale yellow in color. When taking the medication symptoms improve, but they day after taking the medication symptoms return. No abdominal pain. No cramping. No nausea. No vomiting. Has not been sexually active. Asked pt if she took 500mg 4 tablets daily x 7 days and she said she had too many things going on, and she forgot to  the medication. The pharmacy put the medication back and would not fill it again for her. Called [facility] in [place] and they will fill the medication. Advised that pt call back if symptoms worsen or fail to improve. Pt agrees with plan.    Maria C Oconnell RN  [facility]

## 2020-08-06 NOTE — TELEPHONE ENCOUNTER
Left message on answering machine for patient to call back to the RN hotline at [phone].    Maria C Oconnell RN  [facility]

## 2020-08-20 ENCOUNTER — TELEPHONE (OUTPATIENT)
Dept: PSYCHOLOGY | Facility: CLINIC | Age: 20
End: 2020-08-20

## 2020-08-20 NOTE — TELEPHONE ENCOUNTER
Therapist attempted to contact client to get connected via video for session. Client did not answer, so therapist LVM informing her she would leave the video up for 10 minutes to connect and call back in 5 in attempts to connect again. Therapist also provided FCC phone number to contact if she would like to re-schedule.     Daniela Bhatti, [facility] 8/20/2020

## 2020-09-02 ENCOUNTER — TELEPHONE (OUTPATIENT)
Dept: FAMILY MEDICINE | Facility: CLINIC | Age: 20
End: 2020-09-02

## 2020-09-02 DIAGNOSIS — A59.9 TRICHOMONAS INFECTION: ICD-10-CM

## 2020-09-02 RX ORDER — METRONIDAZOLE 500 MG/1
2000 TABLET ORAL DAILY
Qty: 20 TABLET | Refills: 0 | Status: SHIPPED | OUTPATIENT
Start: 2020-09-02 | End: 2020-09-07

## 2020-09-02 NOTE — TELEPHONE ENCOUNTER
Okay for retest at lab only appointment. I recommend dose change:    Signed Prescriptions:                        Disp   Refills    metroNIDAZOLE (FLAGYL) 500 MG tablet       20 tab*0        Sig: Take 4 tablets (2,000 mg) by mouth daily for 5 days  Authorizing Provider: WHITLEY SEGURA      Ensure partners are treated (Flagyl 2 g x 1) - I can call in Rx if needed    Call or return to clinic as needed if these symptoms worsen or fail to improve as anticipated.     (If symptoms persist, plan Tinidazole 2 g daily x 5 d)    Whitley Segura MD

## 2020-09-02 NOTE — TELEPHONE ENCOUNTER
Reason for Call:  Medication or medication refill:    Do you use a [facility]?  Name of the pharmacy and phone number for the current request:     [id number], [place] - [address]      Name of the medication requested: metroNIDAZOLE (FLAGYL) 500 MG tablet    Other request: Patient states that the medication hasn't helped with symptoms and would like a refill or an appointment asap.    Can we leave a detailed message on this number? YES    Phone number patient can be reached at: Cell number on file:    Telephone Information:   Mobile [phone]       Best Time: any    Call taken on 9/2/2020 at 11:05 AM by Elinor Robin

## 2020-09-02 NOTE — TELEPHONE ENCOUNTER
Called patient. Notified her of provider's reply and Rx that was sent today for flagyl. She scheduled lab appt for wet prep tomorrow at 2:30 pm. She will start abx after providing lab sample. States that she is not sexually active, so no partners that need treatment.   Will await results to see if still positive for trich. Pt was very hesitant about doing another course of flagyl stating that she has already taken flagyl multiple times and is continuing to experience symptoms. However she understands that we need wet prep to see if she is still positive for trichomonas. She was notified of provider's reply:  Call or return to clinic as needed if these symptoms worsen or fail to improve as anticipated.   (If symptoms persist, plan Tinidazole 2 g daily x 5 d)

## 2020-09-03 DIAGNOSIS — A59.9 TRICHOMONAS INFECTION: ICD-10-CM

## 2020-09-03 LAB
SPECIMEN SOURCE: ABNORMAL
WET PREP SPEC: ABNORMAL

## 2020-09-03 PROCEDURE — 87210 SMEAR WET MOUNT SALINE/INK: CPT | Performed by: FAMILY MEDICINE

## 2020-09-03 NOTE — RESULT ENCOUNTER NOTE
Teirra,    Your symptoms are due to persistent Trichomonas. Take the medication as planned. Call or return to clinic as needed if these symptoms worsen or fail to improve as anticipated.     Please notify your sexual partners to be treated. You can send me names, dates of birth, and any drug allergies for your partners if you need me to send prescriptions.     Whitley Segura MD

## 2020-09-14 ENCOUNTER — OFFICE VISIT (OUTPATIENT)
Dept: FAMILY MEDICINE | Facility: CLINIC | Age: 20
End: 2020-09-14
Payer: COMMERCIAL

## 2020-09-14 VITALS
HEART RATE: 82 BPM | SYSTOLIC BLOOD PRESSURE: 122 MMHG | TEMPERATURE: 99 F | DIASTOLIC BLOOD PRESSURE: 70 MMHG | WEIGHT: 170 LBS | RESPIRATION RATE: 14 BRPM | OXYGEN SATURATION: 98 % | BODY MASS INDEX: 24.92 KG/M2

## 2020-09-14 DIAGNOSIS — A59.01 TRICHOMONAS VAGINITIS: ICD-10-CM

## 2020-09-14 DIAGNOSIS — N89.8 VAGINAL DISCHARGE: Primary | ICD-10-CM

## 2020-09-14 LAB
SPECIMEN SOURCE: ABNORMAL
WET PREP SPEC: ABNORMAL

## 2020-09-14 PROCEDURE — 87210 SMEAR WET MOUNT SALINE/INK: CPT | Performed by: NURSE PRACTITIONER

## 2020-09-14 PROCEDURE — 99213 OFFICE O/P EST LOW 20 MIN: CPT | Performed by: NURSE PRACTITIONER

## 2020-09-14 RX ORDER — TINIDAZOLE 500 MG/1
500 TABLET ORAL 2 TIMES DAILY
Qty: 14 TABLET | Refills: 0 | Status: SHIPPED | OUTPATIENT
Start: 2020-09-14 | End: 2021-01-27

## 2020-09-14 RX ORDER — TINIDAZOLE 500 MG/1
500 TABLET ORAL 2 TIMES DAILY
Qty: 14 TABLET | Refills: 0 | Status: SHIPPED | OUTPATIENT
Start: 2020-09-14 | End: 2020-09-14

## 2020-09-14 NOTE — PROGRESS NOTES
Subjective     Lubna Woods is a 20 year old female who presents to clinic today for the following health issues:    HPI       Chief Complaint   Patient presents with     RECHECK     Trichomoniasis     Patient has been treated for trichomonas several times since May.  She notes continued vaginal irritation, mild discharge and vaginal odor.  She is not currently sexually active.  She has completed several courses of metronidazole.          Review of Systems   Constitutional, HEENT, cardiovascular, pulmonary, gi and gu systems are negative, except as otherwise noted.      Objective    /70   Pulse 82   Temp 99  F (37.2  C) (Oral)   Resp 14   Wt 77.1 kg (170 lb)   LMP 08/31/2020 (Approximate)   SpO2 98%   BMI 24.92 kg/m    Body mass index is 24.92 kg/m .  Physical Exam   GENERAL: healthy, alert and no distress  RESP: lungs clear to auscultation - no rales, rhonchi or wheezes  CV: regular rate and rhythm, normal S1 S2, no S3 or S4, no murmur, click or rub, no peripheral edema and peripheral pulses strong  MS: no gross musculoskeletal defects noted, no edema    Results for orders placed or performed in visit on 09/14/20 (from the past 24 hour(s))   Wet prep    Specimen: Vagina   Result Value Ref Range    Specimen Description Vagina     Wet Prep Trichomonas seen (A)     Wet Prep No clue cells seen     Wet Prep No yeast seen     Wet Prep WBC'S seen  Few              Assessment & Plan     Vaginal discharge    - Wet prep  - tinidazole (TINDAMAX) 500 MG tablet; Take 1 tablet (500 mg) by mouth 2 times daily    Trichomonas vaginitis  Patient to try tinidazole for 7 days.  If no improvement consider referral to Gyn.  Repeat wet prep in 2 weeks.  - Wet prep; Future           Return in about 2 weeks (around 9/28/2020) for no improvement in symptoms..    SHEA Alonzo CNP  [facility]

## 2020-10-05 ENCOUNTER — TELEPHONE (OUTPATIENT)
Dept: FAMILY MEDICINE | Facility: CLINIC | Age: 20
End: 2020-10-05

## 2020-10-05 DIAGNOSIS — A59.9 TRICHOMONAS INFECTION: Primary | ICD-10-CM

## 2020-10-05 RX ORDER — TINIDAZOLE 500 MG/1
1000 TABLET ORAL 2 TIMES DAILY
Qty: 56 TABLET | Refills: 0 | Status: SHIPPED | OUTPATIENT
Start: 2020-10-05 | End: 2020-10-19

## 2020-10-05 NOTE — TELEPHONE ENCOUNTER
Please  Have patient start tinidazole 1000 mg TID x 14 days and keep follow-up with Ob/Gyn. (indication trichomonoas).    Poppy Nova, CNP

## 2020-10-05 NOTE — TELEPHONE ENCOUNTER
Spoke with pt. States she is requesting a higher dosage of the tinidazole because it is not working. She has an appt with OB for next week. Please advise.     Maria C Oconnell RN  [facility]

## 2020-10-05 NOTE — TELEPHONE ENCOUNTER
Left message on answering machine for patient to call back to the RN hotline at [phone].    Maria C Oconnell RN  [facility]

## 2020-10-05 NOTE — TELEPHONE ENCOUNTER
I reduced dosage to BID.  Up to Date recommends 6329-0381 mg for 14 days in divided doses for resistant trichomonas.    Poppy Nova, CNP

## 2020-10-05 NOTE — TELEPHONE ENCOUNTER
Called and left detailed message for pt with provider's message. Asked that she call back to the RN hotline if any further questions or concerns.    Maria C Oconnell RN  [facility]

## 2020-10-05 NOTE — TELEPHONE ENCOUNTER
Reason for Call:  Other prescription    Detailed comments: pt would like higher dose of cinicazole.  [facility] [employer] [employer]    Phone Number Patient can be reached at: Other phone number:  cell*    Best Time: any    Can we leave a detailed message on this number? YES    Call taken on 10/5/2020 at 8:58 AM by Magda Benites

## 2020-10-05 NOTE — TELEPHONE ENCOUNTER
Poppy,  Please advise regarding warning on rx. Ok to override?    Maria C Oconnell RN  [facility]

## 2020-10-15 ENCOUNTER — OFFICE VISIT (OUTPATIENT)
Dept: OBGYN | Facility: CLINIC | Age: 20
End: 2020-10-15
Payer: COMMERCIAL

## 2020-10-15 VITALS
WEIGHT: 167 LBS | SYSTOLIC BLOOD PRESSURE: 108 MMHG | HEART RATE: 54 BPM | BODY MASS INDEX: 24.48 KG/M2 | DIASTOLIC BLOOD PRESSURE: 66 MMHG

## 2020-10-15 DIAGNOSIS — N89.8 VAGINAL DISCHARGE: Primary | ICD-10-CM

## 2020-10-15 LAB
SPECIMEN SOURCE: NORMAL
WET PREP SPEC: NORMAL

## 2020-10-15 PROCEDURE — 99203 OFFICE O/P NEW LOW 30 MIN: CPT | Performed by: OBSTETRICS & GYNECOLOGY

## 2020-10-15 PROCEDURE — 87210 SMEAR WET MOUNT SALINE/INK: CPT | Performed by: OBSTETRICS & GYNECOLOGY

## 2020-10-15 NOTE — PROGRESS NOTES
Lubna is a 20 year old  referred here by self for consultation regarding recurrent Trichomoniasis..She complaints of vaginal discharge, itching and odor. Her last positive wet prep was on 20. She was initally treated with metronidazole in 3/2020 . With the recent diagnosis she was placed on tinidazole 500 mg po BID for 14 days. The phamarcy only had 5 days worth. She is going to  the rest of the prescription today..     She wants the wet prep repeated today prior to completing the treatment.  She says she has not joselo sexually active with anybody risking reinfection..    ROS: Ten point review of systems was reviewed and negative except the above.    Gyne: - abn pap (last pap ), - STD's    Past Medical History:   Diagnosis Date     Dysmenorrhea 9/10/2014     Marijuana abuse 2017     Suicide attempt (H) 2020     Past Surgical History:   Procedure Laterality Date     NO HISTORY OF SURGERY       Patient Active Problem List   Diagnosis     Dysmenorrhea     Marijuana abuse       ALL/Meds: Her medication and allergy histories were reviewed and are documented in their appropriate chart areas.    SH: - tob, - EtOH,     FH: Her family history was reviewed and documented in its appropriate chart area.    PE: /66   Pulse 54   Wt 75.8 kg (167 lb)   LMP 2020 (Approximate)   Breastfeeding No   BMI 24.48 kg/m    Body mass index is 24.48 kg/m .    General Appearance:  healthy, alert, active, no distress  HEENT: NCAT  Abdomen: Soft, nontender.  Normal bowel sounds.  No masses  Pelvic:       - Ext: NEFG,        - Urethra: no lesions, no masses, no hypermobility       - Urethral Meatus: normal appearance,        - Bladder: no tenderness, no masses       - Vagina: pink, moist, normal rugae, no lesions, white discharge       Nurse for exam.    Results for orders placed or performed in visit on 10/15/20   Wet prep     Status: None    Specimen: Vagina   Result Value Ref Range    Specimen  Description Vagina     Wet Prep No Trichomonas seen     Wet Prep No clue cells seen     Wet Prep No yeast seen     Wet Prep WBC'S seen  Moderate            A/P    ICD-10-CM    1. Vaginal discharge  N89.8 Wet prep     I discussed the normal wet prep results.  I encouraged her to complete the abx treatment since she is still symptomatic.    25 minutes was spent face to face with the patient today discussing her history, diagnosis, and follow-up plan as noted above.  Over 50% of the visit was spent in counseling and coordination of care.    Total Visit Time: 30 minutes.      CEPHAS AGBEH, MD.

## 2020-10-15 NOTE — PATIENT INSTRUCTIONS
If you have any questions regarding your visit, Please contact your care team.  [place] [facility]: [phone]  [facility] CLINIC HOURS TELEPHONE NUMBER   Cephas Agbeh, M.D.      Amanda Moreira-  [place]-         Monday-[place]    8:00a.m-4:45 p.m    Tuesday--[place]     8:00a.m-4:45 p.m.    Thursday-[place]    8:00a.m-4:45 p.m.    Friday-[place]    8:00a.m-4:45 p.m    [facility]   [address] [place] [address]   [phone]-Ask for [facility]   Fax [phone]   [place]-[phone]     [facility]   [address] [place] [address] [place] [address]   [phone]    [facility]  [address] [address]  [phone]  Gxyznze-[phone]   Urgent Care locations:    [facility] Monday-Friday  5 pm - 9 pm  Saturday and Sunday   9 am - 5 pm   Monday-Friday   5 pm - 9 pm  Saturday and Sunday  9 am - 5 pm    [phone]   If you need a medication refill, please contact your pharmacy. Please allow 3 business days for your refill to be completed.  As always, Thank you for trusting us with your healthcare needs!

## 2020-12-20 ENCOUNTER — HEALTH MAINTENANCE LETTER (OUTPATIENT)
Age: 20
End: 2020-12-20

## 2021-01-06 ENCOUNTER — OFFICE VISIT (OUTPATIENT)
Dept: OBGYN | Facility: CLINIC | Age: 21
End: 2021-01-06
Payer: COMMERCIAL

## 2021-01-06 VITALS
WEIGHT: 168.6 LBS | BODY MASS INDEX: 24.72 KG/M2 | SYSTOLIC BLOOD PRESSURE: 111 MMHG | HEART RATE: 61 BPM | OXYGEN SATURATION: 100 % | DIASTOLIC BLOOD PRESSURE: 69 MMHG

## 2021-01-06 DIAGNOSIS — N89.8 VAGINAL ITCHING: ICD-10-CM

## 2021-01-06 DIAGNOSIS — N89.8 VAGINAL DISCHARGE: Primary | ICD-10-CM

## 2021-01-06 DIAGNOSIS — Z86.19 HX OF TRICHOMONAL VAGINITIS: ICD-10-CM

## 2021-01-06 LAB
SPECIMEN SOURCE: NORMAL
WET PREP SPEC: NORMAL

## 2021-01-06 PROCEDURE — 99214 OFFICE O/P EST MOD 30 MIN: CPT | Performed by: OBSTETRICS & GYNECOLOGY

## 2021-01-06 PROCEDURE — 87210 SMEAR WET MOUNT SALINE/INK: CPT | Performed by: OBSTETRICS & GYNECOLOGY

## 2021-01-27 ENCOUNTER — OFFICE VISIT (OUTPATIENT)
Dept: OBGYN | Facility: CLINIC | Age: 21
End: 2021-01-27
Payer: COMMERCIAL

## 2021-01-27 VITALS
BODY MASS INDEX: 23.66 KG/M2 | DIASTOLIC BLOOD PRESSURE: 64 MMHG | WEIGHT: 161.4 LBS | HEART RATE: 59 BPM | SYSTOLIC BLOOD PRESSURE: 115 MMHG | OXYGEN SATURATION: 100 %

## 2021-01-27 DIAGNOSIS — A59.9 TRICHIMONIASIS: ICD-10-CM

## 2021-01-27 DIAGNOSIS — A74.9 CHLAMYDIA INFECTION: ICD-10-CM

## 2021-01-27 DIAGNOSIS — N89.8 VAGINAL DISCHARGE: Primary | ICD-10-CM

## 2021-01-27 DIAGNOSIS — Z11.8 SPECIAL SCREENING EXAMINATION FOR CHLAMYDIAL DISEASE: ICD-10-CM

## 2021-01-27 DIAGNOSIS — N89.8 VAGINAL ODOR: ICD-10-CM

## 2021-01-27 DIAGNOSIS — Z72.51 UNPROTECTED SEX: ICD-10-CM

## 2021-01-27 LAB
SPECIMEN SOURCE: ABNORMAL
WET PREP SPEC: ABNORMAL

## 2021-01-27 PROCEDURE — 87491 CHLMYD TRACH DNA AMP PROBE: CPT | Performed by: OBSTETRICS & GYNECOLOGY

## 2021-01-27 PROCEDURE — 87591 N.GONORRHOEAE DNA AMP PROB: CPT | Performed by: OBSTETRICS & GYNECOLOGY

## 2021-01-27 PROCEDURE — 87210 SMEAR WET MOUNT SALINE/INK: CPT | Performed by: OBSTETRICS & GYNECOLOGY

## 2021-01-27 PROCEDURE — 99214 OFFICE O/P EST MOD 30 MIN: CPT | Performed by: OBSTETRICS & GYNECOLOGY

## 2021-01-27 RX ORDER — TINIDAZOLE 500 MG/1
2000 TABLET ORAL DAILY
Qty: 8 TABLET | Refills: 0 | Status: SHIPPED | OUTPATIENT
Start: 2021-01-27 | End: 2021-01-27 | Stop reason: DRUGHIGH

## 2021-01-27 RX ORDER — TINIDAZOLE 500 MG/1
TABLET ORAL
Qty: 14 TABLET | Refills: 1 | Status: SHIPPED | OUTPATIENT
Start: 2021-01-27 | End: 2021-02-09

## 2021-01-27 NOTE — PROGRESS NOTES
Lubna Woods is a 20 year old female, .  She presents today with history of unprotected sex on 2021.  Since then, she has had a fishy smell and discharge.  She changed soaps and at first it helped, but now it does not.  She had some itching that went away and then it came back.  She has not had any other aggravating or alleviating factors.  She has had vaginal discharge in the past, but this is different.   She is also concerned about the possibility of pregnancy, but she has not missed her cycle yet.  Her cycles usually occur every 26-30 days.        Past Medical History:   Diagnosis Date     Dysmenorrhea 9/10/2014     Marijuana abuse 2017     Suicide attempt (H) 2020       Past Surgical History:   Procedure Laterality Date     NO HISTORY OF SURGERY         Current Outpatient Medications   Medication Sig Dispense Refill     tinidazole (TINDAMAX) 500 MG tablet Take 4 tablets (2,000 mg) by mouth daily for 2 doses 8 tablet 0     ibuprofen (ADVIL/MOTRIN) 400 MG tablet Take 1 tablet (400 mg) by mouth every 6 hours as needed for moderate pain 30 tablet 2       Social History     Socioeconomic History     Marital status: Single     Spouse name: Not on file     Number of children: Not on file     Years of education: Not on file     Highest education level: Not on file   Occupational History     Not on file   Social Needs     Financial resource strain: Not on file     Food insecurity     Worry: Not on file     Inability: Not on file     Transportation needs     Medical: Not on file     Non-medical: Not on file   Tobacco Use     Smoking status: Never Smoker     Smokeless tobacco: Never Used   Substance and Sexual Activity     Alcohol use: Yes     Drug use: Yes     Types: Marijuana     Comment: marijuana      Sexual activity: Yes     Partners: Female, Male     Birth control/protection: None   Lifestyle     Physical activity     Days per week: Not on file     Minutes per session: Not on file     Stress:  Not on file   Relationships     Social connections     Talks on phone: Not on file     Gets together: Not on file     Attends [employer] service: Not on file     Active member of club or organization: Not on file     Attends meetings of clubs or organizations: Not on file     Relationship status: Not on file     Intimate partner violence     Fear of current or ex partner: Not on file     Emotionally abused: Not on file     Physically abused: Not on file     Forced sexual activity: Not on file   Other Topics Concern     Parent/sibling w/ CABG, MI or angioplasty before 65F 55M? Not Asked   Social History Narrative     Not on file       Family History   Problem Relation Age of Onset     Asthma Mother      Allergies Mother      Hypertension Father      Diabetes Father      Unknown/Adopted Maternal Grandfather      Diabetes Paternal Grandmother      Hypertension Paternal Grandmother      Unknown/Adopted Paternal Grandfather        Review of Systems:  10 point ROS of systems including Constitutional, Eyes, Respiratory, Cardiovascular, Gastroenterology, Genitourinary, Integumentary, Muscularskeletal, Psychiatric were all negative except for pertinent positives noted in my HPI and in the PMH.      Exam  /64 (BP Location: Right arm, Cuff Size: Adult Regular)   Pulse 59   Wt 73.2 kg (161 lb 6.4 oz)   LMP 01/05/2021 (Exact Date)   SpO2 100%   BMI 23.66 kg/m    General:  WNWD female, somewhat tearful.  She has odor of marijuana use.   Alert  Oriented x 3  Gait:  Normal  Skin:  Normal skin turgor  HEENT:  NC/AT, EOMI  Abdomen:  Non-tender, non-distended.  Vulva: No external lesions, normal hair distribution, no adenopathy, some creamy discharge seen externally.  BUS:  Normal, no masses noted  Urethra:  No hypermobility seen  Urethral meatus:  No masses noted  Vagina: creamy discharge, well rugated, no lesions  Cervix: Smooth, pink, no visible lesions  Extremities:  No clubbing, no cyanosis and no  edema.      Component      Latest Ref Rng & Units 1/27/2021           3:58 PM   Specimen Description       Vagina   Wet Prep       Few Trich seen  No clue cells seen  No yeast seen          Assessment  Vaginal discharge  Vaginal odor  Trichomoniasis   Unprotected sex      Plan  Wet prep is ordered.  The results are reviewed and Trich is seen.   She had unprotected sex around the time she might have been ovulating.  It is too soon to have a UPT test performed that might be positive.  She is encouraged to have the UPT testing in about 2 weeks, if she misses her menses.  She is not sure if she would keep the pregnancy or consider termination.   We discussed the STI prevention and the use of condoms will help decrease her risks.  We discussed the use of Flagyl verses Tinidazole.  The Up to Date information was reviewed and I had sent the prescription for 2 gm to [facility].  I called the patient and I told her that I had misread the information and I need to change the medication dosing.  She states [facility] did not have the medication and she would like to have me send the prescription to the [facility]'s on record.    Prescription is sent for 500 mg twice a day for 7 days and she will need to give her partner the refill.    GC/Chlamydia testing performed.   Return if symptoms continue.   Questions seem to be answered.   Benja Palma MD

## 2021-01-28 LAB
C TRACH DNA SPEC QL NAA+PROBE: POSITIVE
N GONORRHOEA DNA SPEC QL NAA+PROBE: NEGATIVE
SPECIMEN SOURCE: ABNORMAL
SPECIMEN SOURCE: NORMAL

## 2021-02-01 RX ORDER — AZITHROMYCIN 250 MG/1
1000 TABLET, FILM COATED ORAL ONCE
Qty: 4 TABLET | Refills: 1 | Status: SHIPPED | OUTPATIENT
Start: 2021-02-01 | End: 2021-02-01

## 2021-02-09 DIAGNOSIS — N91.2 ABSENCE OF MENSTRUATION: ICD-10-CM

## 2021-02-09 DIAGNOSIS — N89.8 VAGINAL ODOR: ICD-10-CM

## 2021-02-09 DIAGNOSIS — N89.8 VAGINAL DISCHARGE: Primary | ICD-10-CM

## 2021-02-09 DIAGNOSIS — A59.9 TRICHIMONIASIS: Primary | ICD-10-CM

## 2021-02-09 DIAGNOSIS — N89.8 VAGINAL DISCHARGE: ICD-10-CM

## 2021-02-09 DIAGNOSIS — Z72.51 UNPROTECTED SEX: ICD-10-CM

## 2021-02-09 LAB
HCG UR QL: NEGATIVE
SPECIMEN SOURCE: ABNORMAL
WET PREP SPEC: ABNORMAL

## 2021-02-09 PROCEDURE — 87210 SMEAR WET MOUNT SALINE/INK: CPT | Performed by: OBSTETRICS & GYNECOLOGY

## 2021-02-09 PROCEDURE — 81025 URINE PREGNANCY TEST: CPT | Performed by: OBSTETRICS & GYNECOLOGY

## 2021-02-09 RX ORDER — TINIDAZOLE 500 MG/1
TABLET ORAL
Qty: 28 TABLET | Refills: 1 | Status: SHIPPED | OUTPATIENT
Start: 2021-02-09 | End: 2021-03-26

## 2021-02-11 DIAGNOSIS — N94.6 DYSMENORRHEA: ICD-10-CM

## 2021-02-11 NOTE — TELEPHONE ENCOUNTER
"Routing refill request to provider for review/approval because:  Labs not current:  ALT, AST, CBC    Requested Prescriptions   Pending Prescriptions Disp Refills     ibuprofen (ADVIL/MOTRIN) 400 MG tablet 30 tablet 2     Sig: Take 1 tablet (400 mg) by mouth every 6 hours as needed for moderate pain       NSAID Medications Failed - 2/11/2021  1:35 PM        Failed - Normal ALT on file in past 12 months     No lab results found.          Failed - Normal AST on file in past 12 months     No lab results found.          Failed - Normal CBC on file in past 12 months     Recent Labs   Lab Test 07/31/17  1217   WBC 5.9   RBC 3.78*   HGB 11.6*   HCT 34.8*                    Failed - Normal serum creatinine on file in past 12 months     Recent Labs   Lab Test 07/31/17  1217   CR 0.66       Ok to refill medication if creatinine is low          Passed - Blood pressure under 140/90 in past 12 months     BP Readings from Last 3 Encounters:   01/27/21 115/64   01/06/21 111/69   10/15/20 108/66                 Passed - Recent (12 mo) or future (30 days) visit within the authorizing provider's specialty     Patient has had an office visit with the authorizing provider or a provider within the authorizing providers department within the previous 12 mos or has a future within next 30 days. See \"Patient Info\" tab in inbasket, or \"Choose Columns\" in Meds & Orders section of the refill encounter.              Passed - Patient is age 6-64 years        Passed - Medication is active on med list        Passed - No active pregnancy on record        Passed - No positive pregnancy test in past 12 months             Maria C Oconnell RN  [facility]        "

## 2021-02-11 NOTE — TELEPHONE ENCOUNTER
Patient called crying she is in extreme pain has really bad cramps and would like a refill on her ibuprofen (ADVIL/MOTRIN) 400 MG tablet. Please call patient and advise  This needs to go to [facility].  Danisha Martínez,

## 2021-02-12 RX ORDER — IBUPROFEN 400 MG/1
400 TABLET, FILM COATED ORAL EVERY 6 HOURS PRN
Qty: 60 TABLET | Refills: 2 | Status: SHIPPED | OUTPATIENT
Start: 2021-02-12

## 2021-02-18 ENCOUNTER — TELEPHONE (OUTPATIENT)
Dept: OBGYN | Facility: CLINIC | Age: 21
End: 2021-02-18

## 2021-02-18 NOTE — TELEPHONE ENCOUNTER
RN faxed [facility] form and placed form in STAT scanning. Pt was notified of her result previously.    Rachel El RN on 2/18/2021 at 1:41 PM

## 2021-03-08 ENCOUNTER — VIRTUAL VISIT (OUTPATIENT)
Dept: URGENT CARE | Facility: CLINIC | Age: 21
End: 2021-03-08
Payer: COMMERCIAL

## 2021-03-08 DIAGNOSIS — Z20.822 ENCOUNTER FOR LABORATORY TESTING FOR COVID-19 VIRUS: Primary | ICD-10-CM

## 2021-03-08 PROCEDURE — 99212 OFFICE O/P EST SF 10 MIN: CPT | Mod: 95

## 2021-03-08 NOTE — PROGRESS NOTES
Lubna is a 20 year old who is being evaluated via a billable telephone visit.        Assessment & Plan     Encounter for laboratory testing for COVID-19 virus    - Asymptomatic COVID-19 Virus (Coronavirus) by PCR; Future    Michelle Guerra PA-C  Virtual Urgent Care  [facility] VIRTUAL [facility]RGENT [facility]    Subjective   Lubna is a 20 year old who presents for the following health issues : need for covid test    HPI   Patient is needing to have a Covid test in order to return to work. She had an episode of vomiting and work will not let her return without a covid test.     Review of Systems   Constitutional, HEENT, cardiovascular, pulmonary, gi and gu systems are negative, except as otherwise noted.      Objective           Vitals:  No vitals were obtained today due to virtual visit.    Physical Exam   healthy, alert and no distress  PSYCH: Alert and oriented times 3; coherent speech, normal   rate and volume, able to articulate logical thoughts, able   to abstract reason, no tangential thoughts, no hallucinations   or delusions  Her affect is normal  RESP: No cough, no audible wheezing, able to talk in full sentences  Remainder of exam unable to be completed due to telephone visits            Phone call duration: 7 minutes

## 2021-03-09 ENCOUNTER — OFFICE VISIT (OUTPATIENT)
Dept: LAB | Facility: CLINIC | Age: 21
End: 2021-03-09
Attending: PHYSICIAN ASSISTANT
Payer: COMMERCIAL

## 2021-03-09 ENCOUNTER — OFFICE VISIT (OUTPATIENT)
Dept: OBGYN | Facility: CLINIC | Age: 21
End: 2021-03-09
Payer: COMMERCIAL

## 2021-03-09 VITALS
DIASTOLIC BLOOD PRESSURE: 74 MMHG | SYSTOLIC BLOOD PRESSURE: 121 MMHG | WEIGHT: 166.2 LBS | BODY MASS INDEX: 24.37 KG/M2 | OXYGEN SATURATION: 100 % | HEART RATE: 70 BPM

## 2021-03-09 DIAGNOSIS — Z86.19 HISTORY OF CHLAMYDIA: ICD-10-CM

## 2021-03-09 DIAGNOSIS — Z86.19 HISTORY OF TRICHOMONIASIS: Primary | ICD-10-CM

## 2021-03-09 DIAGNOSIS — Z20.822 ENCOUNTER FOR LABORATORY TESTING FOR COVID-19 VIRUS: ICD-10-CM

## 2021-03-09 LAB
LABORATORY COMMENT REPORT: NORMAL
SARS-COV-2 RNA RESP QL NAA+PROBE: NEGATIVE
SARS-COV-2 RNA RESP QL NAA+PROBE: NORMAL
SPECIMEN SOURCE: ABNORMAL
SPECIMEN SOURCE: NORMAL
SPECIMEN SOURCE: NORMAL
WET PREP SPEC: ABNORMAL

## 2021-03-09 PROCEDURE — U0005 INFEC AGEN DETEC AMPLI PROBE: HCPCS | Performed by: PHYSICIAN ASSISTANT

## 2021-03-09 PROCEDURE — U0003 INFECTIOUS AGENT DETECTION BY NUCLEIC ACID (DNA OR RNA); SEVERE ACUTE RESPIRATORY SYNDROME CORONAVIRUS 2 (SARS-COV-2) (CORONAVIRUS DISEASE [COVID-19]), AMPLIFIED PROBE TECHNIQUE, MAKING USE OF HIGH THROUGHPUT TECHNOLOGIES AS DESCRIBED BY CMS-2020-01-R: HCPCS | Performed by: PHYSICIAN ASSISTANT

## 2021-03-09 PROCEDURE — 99214 OFFICE O/P EST MOD 30 MIN: CPT | Performed by: OBSTETRICS & GYNECOLOGY

## 2021-03-09 PROCEDURE — 87491 CHLMYD TRACH DNA AMP PROBE: CPT | Performed by: OBSTETRICS & GYNECOLOGY

## 2021-03-09 PROCEDURE — 87210 SMEAR WET MOUNT SALINE/INK: CPT | Performed by: OBSTETRICS & GYNECOLOGY

## 2021-03-09 RX ORDER — TINIDAZOLE 500 MG/1
500 TABLET ORAL
Qty: 42 TABLET | Refills: 0 | Status: SHIPPED | OUTPATIENT
Start: 2021-03-09 | End: 2021-03-26

## 2021-03-09 NOTE — PROGRESS NOTES
Lubna Woods is a 20 year old female, .  She presents today stating she has Trich.  She knows she has trich based on the odor.  She  States that she has had sex once since she saw me in early January, and it was with a different individual than the one that gave her Trich last spring.  She expresses that she did not have sex since she had been first diagnosed with Trich last .  She has tried Tinidazole, Flagyl, Metrogel for the Trich, and it remains.  She has not had any other aggravating symptoms.      The patient's medical history, social history and family history are reviewed and no updates indicated at this time.       Review of Systems:  10 point ROS of systems including Constitutional, Eyes, Respiratory, Cardiovascular, Gastroenterology, Genitourinary, Integumentary, Muscularskeletal, Psychiatric were all negative except for pertinent positives noted in my HPI and in the PMH.      Exam  /74 (BP Location: Right arm, Cuff Size: Adult Regular)   Pulse 70   Wt 75.4 kg (166 lb 3.2 oz)   LMP 2021 (Approximate)   SpO2 100%   Breastfeeding No   BMI 24.37 kg/m    General:  WNWD female, NAD  Alert  Oriented x 3  Gait:  Normal  Skin:  Normal skin turgor  HEENT:  NC/AT, EOMI  Abdomen:  Non-tender, non-distended.  Vulva: No external lesions, normal hair distribution, no adenopathy  BUS:  Normal, no masses noted  Urethra:  No hypermobility seen  Urethral meatus:  No masses noted  Vagina: Moist, pink, no abnormal discharge, well rugated, no lesions  Cervix: Smooth, pink, no visible lesions  Uterus: Normal size, anteverted, non-tender, mobile  Ovaries: No mass, non-tender, mobile  Perianal:  No masses noted  Extremities:  No clubbing, no cyanosis and no edema.    Component      Latest Ref Rng & Units 2021 3/9/2021 3/9/2021           2:00 PM  3:20 PM  3:20 PM   Specimen Description        Vagina    Wet Prep       Moderate . . . Trichomonas seen (A) No clue cells seen     Component      Latest  Ref Rng & Units 3/9/2021 3/9/2021           3:20 PM  3:20 PM   Specimen Description           Wet Prep       No yeast seen Moderate . . .       Assessment  Recurrent Trich infection  History of chlamydia       Plan  Wet prep is ordered.   She wants the Tinidazole prescription again.    Chlamydia PCR testing is ordered.   Referral for infectious disease as the Trich infections persist with indicated therapy.    Questions seem to be answered.     Benja Palma MD

## 2021-03-10 ENCOUNTER — TELEPHONE (OUTPATIENT)
Dept: OBGYN | Facility: CLINIC | Age: 21
End: 2021-03-10

## 2021-03-10 LAB
C TRACH DNA SPEC QL NAA+PROBE: NEGATIVE
SPECIMEN SOURCE: NORMAL

## 2021-03-10 NOTE — TELEPHONE ENCOUNTER

## 2021-03-10 NOTE — TELEPHONE ENCOUNTER
RECORDS RECEIVED FROM: Internal   DATE RECEIVED: 03.26.2021    NOTES (Gather within 2 years) STATUS DETAILS   OFFICE NOTE from referring provider   Internal 03.09.2021 Benja Palma MD   OFFICE NOTE from other specialist Internal 09.14.2020 Poppy Nova, SHEA CNP    06.30.2020 Whitley Segura MD   DISCHARGE SUMMARY from hospital N/A    DISCHARGE REPORT from the ER N/A    LABS (any labs) Internal    MEDICATION LIST Internal    IMAGING  (NEED IMAGES AND REPORTS)     Osteomyelitis: Foot imaging  N/A    Liver Abscess: Abdominal imaging N/A    Other (anything related to diagnoses N/A

## 2021-03-26 ENCOUNTER — VIRTUAL VISIT (OUTPATIENT)
Dept: INFECTIOUS DISEASES | Facility: CLINIC | Age: 21
End: 2021-03-26
Attending: OBSTETRICS & GYNECOLOGY
Payer: COMMERCIAL

## 2021-03-26 ENCOUNTER — PRE VISIT (OUTPATIENT)
Dept: INFECTIOUS DISEASES | Facility: CLINIC | Age: 21
End: 2021-03-26

## 2021-03-26 DIAGNOSIS — A59.9 TRICHOMONAS INFECTION: Primary | ICD-10-CM

## 2021-03-26 PROCEDURE — 99205 OFFICE O/P NEW HI 60 MIN: CPT | Mod: 95 | Performed by: INTERNAL MEDICINE

## 2021-03-26 RX ORDER — TINIDAZOLE 500 MG/1
2000 TABLET ORAL
Qty: 28 TABLET | Refills: 0 | Status: SHIPPED | OUTPATIENT
Start: 2021-03-26 | End: 2021-04-16

## 2021-03-26 NOTE — PROGRESS NOTES
"  Lubna Woods is a 20 year old woman who is being evaluated via a billable video visit.      How would you like to obtain your AVS? MyChart  If the video visit is dropped, the invitation should be resent by: Text to cell phone: [phone]  Will anyone else be joining your video visit? No      Reason for visit:   Infectious Disease NEW Visit, referred for resistant Trichomonas infection    HISTORY OF THE PRESENT ILLNESS:    Lubna Woods is a 19 y/o young woman.   She tells me that \"I've been having trich for a year!\"   She is understandably very frustrated.    She describes symptoms of feeling irritation and itchiness inside the vagina with an ongoing cloudy yellow discharge.  Most recent wet prep from 3/9/2021 with Trichomonas still seen.    Lubna reports taking several actions to try and avoid reinfection, and to promote clearance.  She tells me that she only uses tampons on the first day of her menstrual period.   She has avoided sex in general, but did have sex just one time this past January 2021 that did seem to lead to reinfection.    5/27/2020  First Wet Prep with Trichomonas seen  (as reported in our [facility] EPIC instance)  Trichomonas seen  No clue cells, no yeast seen  Rx:  metronidazole 500mg po BID x 7 days    6/30/2020   Trichomonas seen  No clue cells, no yeast seen  Rx:  metronidazole 500mg po BID x 7 days    7/15/2020   Rx:  metronidazole 500mg po BID x 7 days    7/23/2020   Rx:  metronidazole 2000mg po daily x 7 days    9/2/2020  Rx:  metronidazole 2000mg po daily x 5 days    9/3/2020  Trichomonas seen  No clue cells, no yeast seen    9/14/2020  Trichomonas seen  No clue cells, no yeast seen  Rx:  tinidazole 500mg po BID x 7 days    10/5/2020  Rx:  tinidazole 1000mg po BID x 14 days    10/15/2020  No Trichomonas seen  No clue cells, no yeast seen    1/6/2021  No Trichomonas seen  No clue cells, no yeast seen    one time sexual intercourse occured    1/27/2021  Trichomonas seen  No clue " cells, no yeast seen  Chlamydia PCR positive from cervix sample  Rx: tinidazole 500mg po BID x 7 days    2/1/2021  Rx: azithromycin 1000mg po x one time    2/9/2021  Trichomonas seen  No clue cells, no yeast seen  Rx: tinidazole 500mg po BID x 14 days    3/9/2021  Trichomonas seen  No clue cells, no yeast seen  Chlamydia PCR negative from cervix sample  Rx: tinidazole 500mg po BID x 21 days      I have reviewed and updated the patient's Past Medical History, Social History, Family History and Medication List.    OBJECTIVE:    Current Outpatient Medications   Medication     ibuprofen (ADVIL/MOTRIN) 400 MG tablet     tinidazole (TINDAMAX) 500 MG tablet     No current facility-administered medications for this visit.        No Known Allergies    Exam via video visit:     GENERAL: Patient appears well and is not in any acute distress    HEENT: The eyes do not appear to have any icterus or injection.  EOM appear intact.  RESPIRATORY:  No cough or labored breathing is noted.  SKIN:  No rashes are visible  NEURO:  Awake, alert, no focal neurologic deficits are noted.  PSYCH: Affect is bright. Speech fluent and appropriate.      The rest of a comprehensive physical examination is deferred due to the public health emergency video visit restrictions.    Lab review as detailed in HPI along with antimicrobial prescriptions      ASSESSMENT:    Resistant Trichomonas infection    I did a thorough review of Lubna's microbiology results and antimicrobial prescriptions, as detailed in the HPI.  5/27/2020 Trichomonas infection first identified, and treated with standard antimicrobial management with metronidazole, escalated to tinidazole 9/14/2020 when infection did not clear.   Tinidazole dosing was escalated to 1000mg po BID x 14 days, and this regimen was what finally cleared infection with negative wet preps 10/15/2020 and 1/6/2021.    The patient did engage in sexual intercourse again in January 2021, and it seems she was  reinfected with this highly resistant Trichomonas as well as with Chlamydia.  She was able to clear the Chlamydia infection with standard azithromycin treatment, but has not cleared the Trichomonas infection.    At this point, we seem to be dealing with a highly resistant organism.  I suspect that we will need to aggressive, and use combined therapy with the help of the compounding pharmacy.   There is evidence that a very high dose of daily tinidazole is more effective in these situations, so we will try that first, but I expect that combination treatment will be necessary.    PLAN:    - increase tinidazole dose to 2000mg po nightly x 7 days    - if this fails to clear up symptoms, then we will next move to combination therapy:    (A)   paromomycin 6.5% in 5 grams of cream via vaginal applicator, Instill one full applicator into vagina at bedtime for 14 nights    PLUS    (B)   tinidazole 2000mg po nightly x 14 days    Important for these two therapies to be done at the same time for maximum efficacy.    Resistant trichomoniasis: successful treatment with combination therapy.  Sex Transm Dis. 2011 Oct;38(10):962-3.  PMID: [id number]        Video-Visit Details    Type of service:  Video Visit    Video Start Time: 12:46 PM  Video End Time:   1:01 PM    Originating Location (pt. Location): Home    Distant Location (provider location):  [facility] [place]     Platform used for Video Visit: Froy Layton MD, MS  Infectious Disease    Time:  A total of 65 minutes was spent in care of the patient today. 15 minutes were spent on the video, with an additional 20 minutes spent on chart review and orders, and with 30 minutes spent on literature review.

## 2021-03-26 NOTE — LETTER
"3/26/2021       RE: Lubna Woods  [address]  [place] [address]     Dear Colleague,    Thank you for referring your patient, Lubna Woods, to the [facility]. Please see a copy of my visit note below.      Lubna Woods is a 20 year old woman who is being evaluated via a billable video visit.      How would you like to obtain your AVS? MyChart  If the video visit is dropped, the invitation should be resent by: Text to cell phone: [phone]  Will anyone else be joining your video visit? No      Reason for visit:   Infectious Disease NEW Visit, referred for resistant Trichomonas infection    HISTORY OF THE PRESENT ILLNESS:    Lubna Woods is a 21 y/o young woman.   She tells me that \"I've been having trich for a year!\"   She is understandably very frustrated.    She describes symptoms of feeling irritation and itchiness inside the vagina with an ongoing cloudy yellow discharge.  Most recent wet prep from 3/9/2021 with Trichomonas still seen.    Lubna reports taking several actions to try and avoid reinfection, and to promote clearance.  She tells me that she only uses tampons on the first day of her menstrual period.   She has avoided sex in general, but did have sex just one time this past January 2021 that did seem to lead to reinfection.    5/27/2020  First Wet Prep with Trichomonas seen  (as reported in our [facility] EPIC instance)  Trichomonas seen  No clue cells, no yeast seen  Rx:  metronidazole 500mg po BID x 7 days    6/30/2020   Trichomonas seen  No clue cells, no yeast seen  Rx:  metronidazole 500mg po BID x 7 days    7/15/2020   Rx:  metronidazole 500mg po BID x 7 days    7/23/2020   Rx:  metronidazole 2000mg po daily x 7 days    9/2/2020  Rx:  metronidazole 2000mg po daily x 5 days    9/3/2020  Trichomonas seen  No clue cells, no yeast seen    9/14/2020  Trichomonas seen  No clue " cells, no yeast seen  Rx:  tinidazole 500mg po BID x 7 days    10/5/2020  Rx:  tinidazole 1000mg po BID x 14 days    10/15/2020  No Trichomonas seen  No clue cells, no yeast seen    1/6/2021  No Trichomonas seen  No clue cells, no yeast seen    one time sexual intercourse occured    1/27/2021  Trichomonas seen  No clue cells, no yeast seen  Chlamydia PCR positive from cervix sample  Rx: tinidazole 500mg po BID x 7 days    2/1/2021  Rx: azithromycin 1000mg po x one time    2/9/2021  Trichomonas seen  No clue cells, no yeast seen  Rx: tinidazole 500mg po BID x 14 days    3/9/2021  Trichomonas seen  No clue cells, no yeast seen  Chlamydia PCR negative from cervix sample  Rx: tinidazole 500mg po BID x 21 days      I have reviewed and updated the patient's Past Medical History, Social History, Family History and Medication List.    OBJECTIVE:    Current Outpatient Medications   Medication     ibuprofen (ADVIL/MOTRIN) 400 MG tablet     tinidazole (TINDAMAX) 500 MG tablet     No current facility-administered medications for this visit.        No Known Allergies    Exam via video visit:     GENERAL: Patient appears well and is not in any acute distress    HEENT: The eyes do not appear to have any icterus or injection.  EOM appear intact.  RESPIRATORY:  No cough or labored breathing is noted.  SKIN:  No rashes are visible  NEURO:  Awake, alert, no focal neurologic deficits are noted.  PSYCH: Affect is bright. Speech fluent and appropriate.      The rest of a comprehensive physical examination is deferred due to the public health emergency video visit restrictions.    Lab review as detailed in HPI along with antimicrobial prescriptions      ASSESSMENT:    Resistant Trichomonas infection    I did a thorough review of [facility]'s microbiology results and antimicrobial prescriptions, as detailed in the HPI.  5/27/2020 Trichomonas infection first identified, and treated with standard antimicrobial management with metronidazole,  escalated to tinidazole 9/14/2020 when infection did not clear.   Tinidazole dosing was escalated to 1000mg po BID x 14 days, and this regimen was what finally cleared infection with negative wet preps 10/15/2020 and 1/6/2021.    The patient did engage in sexual intercourse again in January 2021, and it seems she was reinfected with this highly resistant Trichomonas as well as with Chlamydia.  She was able to clear the Chlamydia infection with standard azithromycin treatment, but has not cleared the Trichomonas infection.    At this point, we seem to be dealing with a highly resistant organism.  I suspect that we will need to aggressive, and use combined therapy with the help of the compounding pharmacy.   There is evidence that a very high dose of daily tinidazole is more effective in these situations, so we will try that first, but I expect that combination treatment will be necessary.    PLAN:    - increase tinidazole dose to 2000mg po nightly x 7 days    - if this fails to clear up symptoms, then we will next move to combination therapy:    (A)   paromomycin 6.5% in 5 grams of cream via vaginal applicator, Instill one full applicator into vagina at bedtime for 14 nights    PLUS    (B)   tinidazole 2000mg po nightly x 14 days    Important for these two therapies to be done at the same time for maximum efficacy.    Resistant trichomoniasis: successful treatment with combination therapy.  Sex Transm Dis. 2011 Oct;38(10):962-3.  PMID: [id number]        Video-Visit Details    Type of service:  Video Visit    Video Start Time: 12:46 PM  Video End Time:   1:01 PM    Originating Location (pt. Location): Home    Distant Location (provider location):  [facility]ECTI[facility] [place]     Platform used for Video Visit: Froy Layton MD, MS  Infectious Disease    Time:  A total of 65 minutes was spent in care of the patient today. 15 minutes were spent on the video, with an additional  20 minutes spent on chart review and orders, and with 30 minutes spent on literature review.

## 2021-03-30 ENCOUNTER — VIRTUAL VISIT (OUTPATIENT)
Dept: INFECTIOUS DISEASES | Facility: CLINIC | Age: 21
End: 2021-03-30
Attending: INTERNAL MEDICINE
Payer: COMMERCIAL

## 2021-03-30 VITALS — WEIGHT: 166 LBS | BODY MASS INDEX: 24.34 KG/M2

## 2021-03-30 DIAGNOSIS — Z16.30 DRUG RESISTANCE: ICD-10-CM

## 2021-03-30 DIAGNOSIS — A59.9 TRICHOMONAS INFECTION: Primary | ICD-10-CM

## 2021-03-30 PROCEDURE — 99214 OFFICE O/P EST MOD 30 MIN: CPT | Mod: 95 | Performed by: INTERNAL MEDICINE

## 2021-03-30 NOTE — LETTER
3/30/2021       RE: Lubna Woods  [address]  [place] [address]     Dear Colleague,    Thank you for referring your patient, uLbna Woods, to the [facility] INFECTIOUS DISEASE CLINIC [place] at [facility]. Please see a copy of my visit note below.    Chief Complaint   Patient presents with     Follow Up     MEDICATIONS     Weight 75.3 kg (166 lb), not currently breastfeeding.    Lubna Woods is a 20 year old woman who is being evaluated via a billable telephone visit.    The patient talked with the [facility], but then was not available at the time of our scheduled video visit.  I converted to telephone and called her 5:15PM at the end of my clinic when she was available.    Infectious Disease Telephone Visit    Lubna has not yet started the high dose tinidazole.   Her pharmacy was not able to get the medication in quickly.  She thinks it is available for  today, but has not gone to pick it up yet.    I talked with her about my findings when I did some literature searching.  The best regimen I found for resistant Trichomonas was a combination of high dose tinidazole 2000mg po daily x 7-14 days and paromomycin compounded as a vaginal cream nightly x 7-14 days.      Lubna is interested in trying this combination, and would prefer to wait for the vaginal cream to be compounded to start both meds at the same time.    The [facility] compounding pharmacy is closed right now, but I will call them in the morning and get this figured out.    Doses described in these articles below show a concentration of 5% paromomycin in 5 grams, 6.5% paromomycin in 4 grams, and 6.5% paromomycin in 5 grams.   Vaginal ulcerations are reported, and these articles recommend using petroleum jelly as a barrier before insertion of the cream by applicator.      Resistant trichomoniasis: successful treatment with combination therapy  Arpan De La Garza Laura J  Jocelyne  PMID: [id number]    Metronidazole-Resistant Trichomoniasis: Beneficial Pharmacodynamic Relationship With High-Dose Oral Tinidazole and Vaginal Paromomycin Combination Therapy  Shay Mcconnell Katharine Smith  PMID: [id number]    Management of Resistant Trichomoniasis  Shay Montgomery  PMID: [id number]        Rosa Layton MD, MS  Infectious Disease    Time:  A total of 36 minutes was spent in care of the patient today. 6 minutes were spent on the telephone with the patient, with an additional 30 minutes spent on chart review and literature research.

## 2021-03-30 NOTE — PROGRESS NOTES
Chief Complaint   Patient presents with     Follow Up     MEDICATIONS     Weight 75.3 kg (166 lb), not currently breastfeeding.    Lubna Woods is a 20 year old woman who is being evaluated via a billable telephone visit.    The patient talked with the [facility], but then was not available at the time of our scheduled video visit.  I converted to telephone and called her 5:15PM at the end of my clinic when she was available.    Infectious Disease Telephone Visit    Lubna has not yet started the high dose tinidazole.   Her pharmacy was not able to get the medication in quickly.  She thinks it is available for  today, but has not gone to pick it up yet.    I talked with her about my findings when I did some literature searching.  The best regimen I found for resistant Trichomonas was a combination of high dose tinidazole 2000mg po daily x 7-14 days and paromomycin compounded as a vaginal cream nightly x 7-14 days.      Lubna is interested in trying this combination, and would prefer to wait for the vaginal cream to be compounded to start both meds at the same time.    The [place] compounding pharmacy is closed right now, but I will call them in the morning and get this figured out.    Doses described in these articles below show a concentration of 5% paromomycin in 5 grams, 6.5% paromomycin in 4 grams, and 6.5% paromomycin in 5 grams.   Vaginal ulcerations are reported, and these articles recommend using petroleum jelly as a barrier before insertion of the cream by applicator.      Resistant trichomoniasis: successful treatment with combination therapy  Arpan De La Garza Laura J Mulcahy  PMID: [id number]    Metronidazole-Resistant Trichomoniasis: Beneficial Pharmacodynamic Relationship With High-Dose Oral Tinidazole and Vaginal Paromomycin Combination Therapy  Shay Mcconnell Katharine Smith  PMID: [id number]    Management of Resistant Trichomoniasis  Shay Montgomery  PMID:  [id number]        Rosa Layton MD, MS  Infectious Disease    Time:  A total of 36 minutes was spent in care of the patient today. 6 minutes were spent on the telephone with the patient, with an additional 30 minutes spent on chart review and literature research.

## 2021-03-31 NOTE — PROGRESS NOTES
Lubna Woods is a 20 year old female, .  She presents today with runny and sometimes pasty vaginal discharge.  She feels this is more than usual.  She reports a weird smell, similar to when she was diagnosed with Trich in the past.  She states she still has the symptoms of Trich and the various medications used have not helped.  The symptoms first started in March.  She states that since that time she has not had sex and she does not use any sex toys or sharing.  Sometimes she has associated vaginal itching.   The symptoms are worse before her menses, not during or immediately after her menses.  She has changed her soaps and  She uses Dove non-scented.  She also uses a clear detergent and washes all her own clothes.  She has used Monistat last April, and it did not help much.  She also has a history of Trich infections and she has used both Flagyl and  Tinidazole.      The chart is reviewed and the she has had wet preps on the following various days and the results are as noted.     2020  Negative  2020  Trichomonas   2020  Trichomonas   2020  Trichomonas   2020  Trichomonas  10/15/2020   Negative, but she was placed on antibiotics anyway.        Past Medical History:   Diagnosis Date     Dysmenorrhea 09/10/2014     Marijuana abuse 2017     Suicide attempt (H) 2020     Trichomonas vaginitis        Past Surgical History:   Procedure Laterality Date     NO HISTORY OF SURGERY         OB History    Para Term  AB Living   0 0 0 0 0 0   SAB TAB Ectopic Multiple Live Births   0 0 0 0 0       Current Outpatient Medications   Medication Sig Dispense Refill     ibuprofen (ADVIL/MOTRIN) 400 MG tablet Take 1 tablet (400 mg) by mouth every 6 hours as needed for moderate pain 60 tablet 2     tinidazole (TINDAMAX) 500 MG tablet Take 4 tablets (2,000 mg) by mouth daily (with breakfast) 28 tablet 0        No Known Allergies    Social History     Tobacco Use     Smoking  status: Never Smoker     Smokeless tobacco: Never Used   Substance Use Topics     Alcohol use: Yes     Drug use: Yes     Types: Marijuana     Comment: marijuana        Family History   Problem Relation Age of Onset     Asthma Mother      Allergies Mother      Hypertension Father      Diabetes Father      Unknown/Adopted Maternal Grandfather      Diabetes Paternal Grandmother      Hypertension Paternal Grandmother      Unknown/Adopted Paternal Grandfather        Review of Systems:  10 point ROS of systems including Constitutional, Eyes, Respiratory, Cardiovascular, Gastroenterology, Genitourinary, Integumentary, Muscularskeletal, Psychiatric were all negative except for pertinent positives noted in my HPI and in the PMH.      Exam  /69 (BP Location: Right arm, Cuff Size: Adult Regular)   Pulse 61   Wt 76.5 kg (168 lb 9.6 oz)   LMP 01/05/2021 (Exact Date)   SpO2 100%   Breastfeeding No   BMI 24.72 kg/m    General:  WNWD female, seems upset.   Alert  Oriented x 3  Gait:  Normal  Skin:  Normal skin turgor  HEENT:  NC/AT, EOMI  Abdomen:  Non-tender, non-distended.  Vulva: No external lesions, normal hair distribution, no adenopathy  BUS:  Normal, no masses noted  Urethra:  No hypermobility seen  Urethral meatus:  No masses noted  Vagina: Moist, pink, no abnormal discharge, well rugated, no lesions  Cervix: Smooth, pink, no visible lesions  Uterus: Normal size, anteverted, non-tender, mobile  Ovaries: No mass, non-tender, mobile  Perianal:  No masses noted  Extremities:  No clubbing, no cyanosis and no edema.      Labs:  Component      Latest Ref Rng & Units 1/6/2021 1/6/2021 1/6/2021          11:20 AM 11:20 AM 11:20 AM   Specimen Description       Vagina     Wet Prep       Few . . . No Trichomonas seen No clue cells seen     Component      Latest Ref Rng & Units 1/6/2021          11:20 AM   Specimen Description          Wet Prep       No yeast seen       Assessment  Vaginal discharge  Vaginal itching.    History of recurrent Trichomonas infections      Plan  The chart was extensively reviewed as noted above, including the family practice notes and the prior OB/GYN notes.  We discussed the lab results and sometimes there might not be organisms seen, but it doesn't suggest she doesn't have symptoms or infections.  I discussed with her that in the future, I want to know when she has the symptoms and she should plan to come in for these to be checked instead of calling and asking for the medications.  She would like the antibiotics today, but with the negative testing, I suggest she wait.  She agrees to this.   RTC as needed.  She will be added to the schedule as needed.     Total time preparing to see patient with reviewing prior encounter and labs, face to face time,  and coordinating care on the same calendar date:  35 minutes.       Benja Palma MD

## 2021-04-16 DIAGNOSIS — Z86.19 HISTORY OF INFECTION DUE TO DRUG-RESISTANT ORGANISM: ICD-10-CM

## 2021-04-16 DIAGNOSIS — A59.9 TRICHOMONAS INFECTION: Primary | ICD-10-CM

## 2021-04-16 RX ORDER — TINIDAZOLE 500 MG/1
2000 TABLET ORAL AT BEDTIME
Qty: 56 TABLET | Refills: 0 | Status: SHIPPED | OUTPATIENT
Start: 2021-04-16 | End: 2021-12-21

## 2021-06-13 DIAGNOSIS — Z11.3 ROUTINE SCREENING FOR STI (SEXUALLY TRANSMITTED INFECTION): Primary | ICD-10-CM

## 2021-06-13 DIAGNOSIS — Z86.19 HISTORY OF TRICHOMONIASIS: ICD-10-CM

## 2021-06-13 NOTE — PROGRESS NOTES
"  Infectious Disease Note      Patient reached out to me with request for overall STI screening, and re-testing for Trichomonas.   She has history of recurrent Trichomonas that did not respond to standard treatment.    We ended up using a combination of oral tinidazole 2000mg nightly and intravaginal suppositories paromomycin 6.5% in 5 grams of cream.   This combination was given for 14 days.  Patient reported symptom relief, but was not sure if everything was \"normal.\"    Now she has some vaginal discharge and smell that seems to have changed.    Lab visit  Will set her up for a lab visit for standard HIV screen, Treponema antibody, Hepatitis B and Hepatitis C screening.    Urine for GC/Chlamydia.      Nurse visit for throat swab and self-collection of vaginal swab for wet prep.  For the clinic nurse/CMA:    - Request wet prep kit from lab.  Patient can self collect a vaginal swab ~ swab about 1 inch into vaginal canal and put the swab into the tube provided.  - Standard throat swab for GC/Chlamydia      Rosa Layton MD, MS  Infectious Disease  "

## 2021-06-18 ENCOUNTER — ALLIED HEALTH/NURSE VISIT (OUTPATIENT)
Dept: INFECTIOUS DISEASES | Facility: CLINIC | Age: 21
End: 2021-06-18
Payer: COMMERCIAL

## 2021-06-18 DIAGNOSIS — N76.0 BACTERIAL VAGINOSIS: ICD-10-CM

## 2021-06-18 DIAGNOSIS — Z11.3 ROUTINE SCREENING FOR STI (SEXUALLY TRANSMITTED INFECTION): ICD-10-CM

## 2021-06-18 DIAGNOSIS — Z86.19 HISTORY OF TRICHOMONIASIS: ICD-10-CM

## 2021-06-18 DIAGNOSIS — Z11.3 ROUTINE SCREENING FOR STI (SEXUALLY TRANSMITTED INFECTION): Primary | ICD-10-CM

## 2021-06-18 DIAGNOSIS — B96.89 BACTERIAL VAGINOSIS: ICD-10-CM

## 2021-06-18 LAB
SPECIMEN SOURCE: ABNORMAL
WET PREP SPEC: ABNORMAL

## 2021-06-18 PROCEDURE — 87389 HIV-1 AG W/HIV-1&-2 AB AG IA: CPT | Mod: 90 | Performed by: PATHOLOGY

## 2021-06-18 PROCEDURE — 86706 HEP B SURFACE ANTIBODY: CPT | Mod: 90 | Performed by: PATHOLOGY

## 2021-06-18 PROCEDURE — 36415 COLL VENOUS BLD VENIPUNCTURE: CPT | Performed by: PATHOLOGY

## 2021-06-18 PROCEDURE — 87591 N.GONORRHOEAE DNA AMP PROB: CPT | Mod: 90 | Performed by: PATHOLOGY

## 2021-06-18 PROCEDURE — 86803 HEPATITIS C AB TEST: CPT | Mod: 90 | Performed by: PATHOLOGY

## 2021-06-18 PROCEDURE — 99000 SPECIMEN HANDLING OFFICE-LAB: CPT | Performed by: PATHOLOGY

## 2021-06-18 PROCEDURE — 86704 HEP B CORE ANTIBODY TOTAL: CPT | Mod: 90 | Performed by: PATHOLOGY

## 2021-06-18 PROCEDURE — 87340 HEPATITIS B SURFACE AG IA: CPT | Mod: 90 | Performed by: PATHOLOGY

## 2021-06-18 PROCEDURE — 86780 TREPONEMA PALLIDUM: CPT | Mod: 90 | Performed by: PATHOLOGY

## 2021-06-18 PROCEDURE — 87491 CHLMYD TRACH DNA AMP PROBE: CPT | Mod: 90 | Performed by: PATHOLOGY

## 2021-06-18 PROCEDURE — 87210 SMEAR WET MOUNT SALINE/INK: CPT | Performed by: PATHOLOGY

## 2021-06-18 RX ORDER — METRONIDAZOLE 7.5 MG/G
1 GEL VAGINAL AT BEDTIME
Qty: 25 G | Refills: 0 | Status: SHIPPED | OUTPATIENT
Start: 2021-06-18 | End: 2021-06-23

## 2021-06-18 NOTE — PROGRESS NOTES
Throat swab collected by CMA, and wet prep vaginal swab collected by pt. Both swabs sent to labs for processing.     Yohana ARIZA CMA

## 2021-06-18 NOTE — PROGRESS NOTES
Wet prep with clue cells seen.   No trichomonas!   Patient complaining of fishy smell and irritation, consistent with clue cells and bacterial vaginosis.    Patient had to take a lot of pills that made her nauseous for a long time while dealing with her highly resistant trichomonas.     We will do metronidazole vaginal gel, 1 applicator nightly for 5 nights.    I spoke to the patient and sent the prescription to her preferred pharmacy.      Rosa Layton MD, MS  Infectious Disease

## 2021-06-19 LAB
C TRACH DNA SPEC QL NAA+PROBE: NEGATIVE
C TRACH DNA SPEC QL NAA+PROBE: NEGATIVE
N GONORRHOEA DNA SPEC QL NAA+PROBE: NEGATIVE
N GONORRHOEA DNA SPEC QL NAA+PROBE: NEGATIVE
SPECIMEN SOURCE: NORMAL
T PALLIDUM AB SER QL: NONREACTIVE

## 2021-06-21 LAB
HBV CORE AB SERPL QL IA: NONREACTIVE
HBV SURFACE AB SERPL IA-ACNC: 4.44 M[IU]/ML
HBV SURFACE AG SERPL QL IA: NONREACTIVE
HCV AB SERPL QL IA: NONREACTIVE
HIV 1+2 AB+HIV1 P24 AG SERPL QL IA: NONREACTIVE

## 2021-10-03 ENCOUNTER — HEALTH MAINTENANCE LETTER (OUTPATIENT)
Age: 21
End: 2021-10-03

## 2021-10-27 ENCOUNTER — OFFICE VISIT (OUTPATIENT)
Dept: OBGYN | Facility: CLINIC | Age: 21
End: 2021-10-27
Payer: COMMERCIAL

## 2021-10-27 VITALS
HEIGHT: 68 IN | OXYGEN SATURATION: 99 % | WEIGHT: 178.4 LBS | SYSTOLIC BLOOD PRESSURE: 105 MMHG | DIASTOLIC BLOOD PRESSURE: 69 MMHG | BODY MASS INDEX: 27.04 KG/M2 | HEART RATE: 71 BPM

## 2021-10-27 DIAGNOSIS — Z12.4 PAP SMEAR FOR CERVICAL CANCER SCREENING: ICD-10-CM

## 2021-10-27 DIAGNOSIS — N89.8 VAGINAL ODOR: Primary | ICD-10-CM

## 2021-10-27 DIAGNOSIS — Z11.3 SCREEN FOR STD (SEXUALLY TRANSMITTED DISEASE): ICD-10-CM

## 2021-10-27 LAB
CLUE CELLS: ABNORMAL
TRICHOMONAS, WET PREP: ABNORMAL
WBC'S/HIGH POWER FIELD, WET PREP: ABNORMAL
YEAST, WET PREP: ABNORMAL

## 2021-10-27 PROCEDURE — 87210 SMEAR WET MOUNT SALINE/INK: CPT | Performed by: OBSTETRICS & GYNECOLOGY

## 2021-10-27 PROCEDURE — 87591 N.GONORRHOEAE DNA AMP PROB: CPT | Performed by: OBSTETRICS & GYNECOLOGY

## 2021-10-27 PROCEDURE — 99212 OFFICE O/P EST SF 10 MIN: CPT | Mod: 25 | Performed by: OBSTETRICS & GYNECOLOGY

## 2021-10-27 PROCEDURE — G0145 SCR C/V CYTO,THINLAYER,RESCR: HCPCS | Performed by: OBSTETRICS & GYNECOLOGY

## 2021-10-27 PROCEDURE — 99395 PREV VISIT EST AGE 18-39: CPT | Performed by: OBSTETRICS & GYNECOLOGY

## 2021-10-27 PROCEDURE — G0124 SCREEN C/V THIN LAYER BY MD: HCPCS

## 2021-10-27 PROCEDURE — 87491 CHLMYD TRACH DNA AMP PROBE: CPT | Performed by: OBSTETRICS & GYNECOLOGY

## 2021-10-27 ASSESSMENT — MIFFLIN-ST. JEOR: SCORE: 1612.72

## 2021-10-27 NOTE — PROGRESS NOTES
Lubna Woods is a 21 year old female , who presents for annual exam.   No unusual bleeding, no incontinence.  She notes having a vaginal odor.  She noted this after her menses.  She has history of medication resistant Trich and she has seen Infectious Disease for this.      Past Medical History:   Diagnosis Date     Dysmenorrhea 09/10/2014     History of chlamydia 2021     Marijuana abuse 2017     Suicide attempt (H) 2020     Trichomonas vaginitis     drug resistence.  was seen by Infectious Disease.       Past Surgical History:   Procedure Laterality Date     NO HISTORY OF SURGERY         OB History    Para Term  AB Living   0 0 0 0 0 0   SAB TAB Ectopic Multiple Live Births   0 0 0 0 0       Gyn History:  Gynecological History         Patient's last menstrual period was 10/05/2021 (approximate).     STD Trich, Chlamydia/no PID/no IUD      history of abnormal pap smear:  No previous pap smear   Last pap: No results found for: PAP        Current Outpatient Medications   Medication Sig Dispense Refill     ibuprofen (ADVIL/MOTRIN) 400 MG tablet Take 1 tablet (400 mg) by mouth every 6 hours as needed for moderate pain 60 tablet 2     COMPOUNDED NON-CONTROLLED SUBSTANCE (CMPD RX) - PHARMACY TO MIX COMPOUNDED MEDICATION paromomycin 6.5% in 5 grams of cream via vaginal applicator, Instill one full applicator into vagina at bedtime for 14 nights 14 applicator 0     tinidazole (TINDAMAX) 500 MG tablet Take 4 tablets (2,000 mg) by mouth At Bedtime (Patient not taking: Reported on 10/27/2021) 56 tablet 0       No Known Allergies    Social History     Socioeconomic History     Marital status: Single     Spouse name: Not on file     Number of children: Not on file     Years of education: Not on file     Highest education level: Not on file   Occupational History     Not on file   Tobacco Use     Smoking status: Never Smoker     Smokeless tobacco: Never Used   Substance and Sexual Activity      "Alcohol use: Yes     Drug use: Yes     Types: Marijuana     Comment: marijuana      Sexual activity: Yes     Partners: Female, Male     Birth control/protection: None     Comment: predominantly female partner   Other Topics Concern     Parent/sibling w/ CABG, MI or angioplasty before 65F 55M? Not Asked   Social History Narrative     Not on file     Social Determinants of Health     Financial Resource Strain:      Difficulty of Paying Living Expenses:    Food Insecurity:      Worried About Running Out of Food in the Last Year:      Ran Out of Food in the Last Year:    Transportation Needs:      Lack of Transportation (Medical):      Lack of Transportation (Non-Medical):    Physical Activity:      Days of Exercise per Week:      Minutes of Exercise per Session:    Stress:      Feeling of Stress :    Social Connections:      Frequency of Communication with Friends and Family:      Frequency of Social Gatherings with Friends and Family:      Attends [employer] Services:      Active Member of Clubs or Organizations:      Attends Club or Organization Meetings:      Marital Status:    Intimate Partner Violence:      Fear of Current or Ex-Partner:      Emotionally Abused:      Physically Abused:      Sexually Abused:        Family History   Problem Relation Age of Onset     Asthma Mother      Allergies Mother      Hypertension Father      Diabetes Father      Unknown/Adopted Maternal Grandfather      Diabetes Paternal Grandmother      Hypertension Paternal Grandmother      Unknown/Adopted Paternal Grandfather          ROS:  All negative except as above.      EXAM:  /69 (BP Location: Right arm, Cuff Size: Adult Regular)   Pulse 71   Ht 1.727 m (5' 8\")   Wt 80.9 kg (178 lb 6.4 oz)   LMP 10/05/2021 (Approximate)   SpO2 99%   Breastfeeding No   BMI 27.13 kg/m    General:  WNWD female, NAD  Alert  Oriented x 3  Gait:  Normal  Skin:  Normal skin turgor  Neurologic:  CN grossly intact, good sensation.    HEENT:  NC/AT, " EOMI  Neck:  No masses palpated, symmetrical, carotids +2/4, no bruits heard  Heart:  RRR  Lungs:  Clear   Breasts:  Declined   Abdomen:  Non-tender, non-distended.  Vulva: No external lesions, normal hair distribution, no adenopathy  BUS:  Normal, no masses noted  Urethra:  No hypermobility noted  Urethral meatus:  No masses noted  Vagina: Moist, pink, well rugated, no lesions.  Small amount of mildly foamy discharge  Cervix: Smooth, pink, no visible lesions  Uterus: Normal size, anteverted, non-tender, mobile  Ovaries: No mass, non-tender, mobile  Perianal:  No masses noted.    Extremities:  No clubbing, cyanosis, or edema      ASSESSMENT/PLAN   Annual examination with pap smear  Vaginal odor and Vaginal discharge:  Wet prep ordered.   The wet prep did not show any concerning findings.  She is given the results and she is reassured.    No Trich seen on the wet prep.    STD prevention/risk reduction discussed.    GC/Chlamydia testing is ordered.   HPV vaccine discussed.  She does not like needles so she is hesitant.   Low fat diet, weight bearing exercises and self breast exams on a monthly basis have been recommended.  I have discussed with patient the risks, benefits, medications, treatment options and modalities.   I have instructed the patient to call or schedule a follow-up appointment if any problems.

## 2021-10-28 LAB
C TRACH DNA SPEC QL PROBE+SIG AMP: NEGATIVE
N GONORRHOEA DNA SPEC QL NAA+PROBE: NEGATIVE

## 2021-11-01 LAB
BKR LAB AP GYN ADEQUACY: ABNORMAL
BKR LAB AP GYN INTERPRETATION: ABNORMAL
BKR LAB AP HPV REFLEX: NO
BKR LAB AP LMP: ABNORMAL
BKR LAB AP PREVIOUS ABNORMAL: ABNORMAL
PATH REPORT.COMMENTS IMP SPEC: ABNORMAL
PATH REPORT.RELEVANT HX SPEC: ABNORMAL

## 2021-11-02 ENCOUNTER — PATIENT OUTREACH (OUTPATIENT)
Dept: OBGYN | Facility: CLINIC | Age: 21
End: 2021-11-02

## 2021-12-21 ENCOUNTER — OFFICE VISIT (OUTPATIENT)
Dept: INFECTIOUS DISEASES | Facility: CLINIC | Age: 21
End: 2021-12-21
Attending: INTERNAL MEDICINE
Payer: COMMERCIAL

## 2021-12-21 VITALS
OXYGEN SATURATION: 100 % | BODY MASS INDEX: 27.08 KG/M2 | HEART RATE: 57 BPM | WEIGHT: 178.1 LBS | DIASTOLIC BLOOD PRESSURE: 67 MMHG | SYSTOLIC BLOOD PRESSURE: 111 MMHG

## 2021-12-21 DIAGNOSIS — Z11.3 ROUTINE SCREENING FOR STI (SEXUALLY TRANSMITTED INFECTION): ICD-10-CM

## 2021-12-21 DIAGNOSIS — N89.8 VAGINAL DISCHARGE: Primary | ICD-10-CM

## 2021-12-21 LAB
CLUE CELLS: NORMAL
TRICHOMONAS, WET PREP: NORMAL
WBC'S/HIGH POWER FIELD, WET PREP: NORMAL
YEAST, WET PREP: NORMAL

## 2021-12-21 PROCEDURE — G0463 HOSPITAL OUTPT CLINIC VISIT: HCPCS

## 2021-12-21 PROCEDURE — 99215 OFFICE O/P EST HI 40 MIN: CPT | Performed by: INTERNAL MEDICINE

## 2021-12-21 PROCEDURE — 87491 CHLMYD TRACH DNA AMP PROBE: CPT | Performed by: INTERNAL MEDICINE

## 2021-12-21 PROCEDURE — 87210 SMEAR WET MOUNT SALINE/INK: CPT | Performed by: INTERNAL MEDICINE

## 2021-12-21 PROCEDURE — 87075 CULTR BACTERIA EXCEPT BLOOD: CPT | Performed by: INTERNAL MEDICINE

## 2021-12-21 PROCEDURE — 87591 N.GONORRHOEAE DNA AMP PROB: CPT | Performed by: INTERNAL MEDICINE

## 2021-12-21 PROCEDURE — 87205 SMEAR GRAM STAIN: CPT | Performed by: INTERNAL MEDICINE

## 2021-12-21 ASSESSMENT — PAIN SCALES - GENERAL: PAINLEVEL: NO PAIN (0)

## 2021-12-21 NOTE — Clinical Note
12/21/2021       RE: Lubna Woods  [address]  [place] [address]     Dear Colleague,    Thank you for referring your patient, Lubna Woods, to the [facility] INFECTIOUS DISEASE CLINIC [facility]. Please see a copy of my visit note below.    Infectious Disease - in person visit.   Documentation to follow.      Again, thank you for allowing me to participate in the care of your patient.      Sincerely,    Rsoa Layton MD

## 2021-12-21 NOTE — LETTER
Date:January 7, 2022      Patient was self referred, no letter generated. Do not send.        [facility] Health Information

## 2021-12-21 NOTE — NURSING NOTE
Chief Complaint   Patient presents with     RECHECK     follow up with pelvic exam       /67   Pulse 57   Wt 80.8 kg (178 lb 1.6 oz)   SpO2 100%   BMI 27.08 kg/m        Yohana ARIZA CMA

## 2021-12-21 NOTE — PROGRESS NOTES
Infectious Disease Clinic Note    Reason for visit:  Return visit, vaginal discharge with patient concern for infection/STI    SUBJECTIVE:    Lubna Woods is a healthy 22 yo woman.  I treated her for a resistant Trichomonas infection in March/April 2021.  We needed a combination of oral tinidazole 2000mg nightly and intravaginal suppositories paromomycin 6.5% in 5 grams of cream.   This combination was given for 14 days.   In June 2021, she had some discomfort and I diagnosed bacterial vaginosis (BV), which was treated with metronidazole vaginal gel, as the patient's preference was to avoid more pills if she could.    Lately Lubna has grown concerned that something just does not seem right from the vagina.  She has noted more vaginal discharge, white but thin, almost watery.  There is no discomfort or itching. Sometimes she thinks there is a foul smell, but not always.  No urinary symptoms.  No pelvic pain.  No external sores.    Her concerns about the health of the vagina has led to her avoiding sexual intercourse.  In the past, she has engaged in sex with both men and women, and prefers sex with women.   She does not think she has had any vaginal sex since June 2021 when she was treated for BV.      OBJECTIVE:    Current Outpatient Medications   Medication     ibuprofen (ADVIL/MOTRIN) 400 MG tablet     No current facility-administered medications for this visit.        On exam:  VS: /67   Pulse 57   Wt 80.8 kg (178 lb 1.6 oz)   SpO2 100%   BMI 27.08 kg/m      General: healthy appearing young woman, awake, alert, engaged    Pelvic exam:  Pubic hair is shaved with some areas of irritation, previous in grown hairs  No lesions are noted externally, and labia and vulva appear normal  Speculum was inserted to reveal healthy vaginal mucosa, no red or inflamed areas  White thin discharge with odor, some scant froth  Visualized cervix was normal appearing, no redness or inflamed areas      Labs:  Reviewed  prior microbiology results  6/2021 HIV, syphilis, Hep B, and Hep C screening all negative    Imaging:  None    ASSESSMENT and PLAN:    -Vaginal discharge  -Routine STI screening    I offered Lubna reassurance that pelvic exam was very normal today.   Vaginal discharge was normal white and thin and without odor.  Given her extensive antimicrobial treatment in 2021, there is a risk for bacterial overgrowth, so I asked the microbiology lab to process a swab for culture of any predominant aerobic organisms that may be pathogenic.    Since her complete STI screening for HIV, syphilis, Hep B, and Hep C screening were all negative in 6/2021, I elected not to repeat these today.  I did repeat Chlamydia and Gonorrhea as those would be the more likely to contribute to her symptoms.    Orders Placed This Encounter   Procedures         Wet prep  (will look for WBCs, yeast, clue cells, and trichomonads)         Swab Aerobic Bacterial Culture Routine with Gram Stain         Neisseria gonorrhoeae PCR (Urine)         Chlamydia trachomatis PCR (Urine)       I reminded Lubna that she did have an abnormal PAP with [facility] this past 10/27/2021, and repeat pelvic exam with cervical PAP is recommended one year from the abnormal result.      Roas Layton MD, MS  Infectious Disease    Time:  I spent 45 minutes in direct care with this patient, including 35 minutes in the room for history and exam, and 10 minutes on additional orders and care coordination in discussion with microbiology laboratory on day of service.      Office Visit on 12/21/2021   Component Date Value Ref Range Status     Trichomonas 12/21/2021 Absent  Absent Final     Yeast 12/21/2021 Absent  Absent Final     Clue Cells 12/21/2021 Absent  Absent Final     WBCs/high power field 12/21/2021 None  None Final             Chlamydia trachomatis 12/21/2021 Negative  Negative Final     Neisseria gonorrhoeae 12/21/2021 Negative  Negative Final             Culture 12/21/2021 1+  Streptococcus agalactiae (Group B Streptococcus)*  Final     Culture 12/21/2021 1+ Enterococcus faecalis*  Final     Culture 12/21/2021 1+ Normal oneyda   Final     Gram Stain Result 12/21/2021 4+ Mixed oneyda   Final     Gram Stain Result 12/21/2021 No white blood cells seen   Final       There were no WBCs on the wet prep NOR the gram stain, suggesting there is little sign of inflammation. Pelvic examination was reassuring as well.   Culture with just Group B Strep and Enterococcus, both are typical oneyda for the vagina.    I do not recommend any treatment.   Again, would reassure Teirra that all is well without concern for infection or abnormality.      Rosa Layton MD, MS  Infectious Disease

## 2021-12-22 LAB
C TRACH DNA SPEC QL NAA+PROBE: NEGATIVE
N GONORRHOEA DNA SPEC QL NAA+PROBE: NEGATIVE

## 2021-12-24 LAB
BACTERIA SPEC CULT: ABNORMAL
GRAM STAIN RESULT: ABNORMAL
GRAM STAIN RESULT: ABNORMAL

## 2022-01-22 NOTE — TELEPHONE ENCOUNTER
Called patient. She states that she completed the Flagyl around 07/07 or 07/08. States this was her third time having metronidazole for trichomonas and she is still having discharge and slight itching. States that she had a rash which is gone now and itching is improved. She is unsure if the infection is fully cleared due to continuing discharge and is wondering if she needs a different prescription for antibiotics or what she needs to do.   stretcher

## 2022-09-09 ENCOUNTER — TRANSFERRED RECORDS (OUTPATIENT)
Dept: SURGERY | Facility: CLINIC | Age: 22
End: 2022-09-09

## 2022-09-10 ENCOUNTER — HEALTH MAINTENANCE LETTER (OUTPATIENT)
Age: 22
End: 2022-09-10

## 2022-09-24 ENCOUNTER — E-VISIT (OUTPATIENT)
Dept: URGENT CARE | Facility: CLINIC | Age: 22
End: 2022-09-24
Payer: COMMERCIAL

## 2022-09-24 DIAGNOSIS — N89.8 VAGINAL DISCHARGE: Primary | ICD-10-CM

## 2022-09-24 PROCEDURE — 99421 OL DIG E/M SVC 5-10 MIN: CPT | Performed by: NURSE PRACTITIONER

## 2022-09-24 NOTE — PATIENT INSTRUCTIONS
Thank you for choosing us for your care. Given your symptoms, I would like you to do a lab-only visit to determine what is causing them.  I have placed the orders.  Please schedule an appointment with the lab right here in [facility], or call [phone].  I will let you know when the results are back and next steps to take.

## 2022-09-26 ENCOUNTER — LAB (OUTPATIENT)
Dept: LAB | Facility: CLINIC | Age: 22
End: 2022-09-26
Payer: COMMERCIAL

## 2022-09-26 DIAGNOSIS — N89.8 VAGINAL DISCHARGE: ICD-10-CM

## 2022-09-26 LAB
ALBUMIN UR-MCNC: NEGATIVE MG/DL
APPEARANCE UR: CLEAR
BACTERIA #/AREA URNS HPF: ABNORMAL /HPF
BILIRUB UR QL STRIP: NEGATIVE
CLUE CELLS: ABNORMAL
COLOR UR AUTO: YELLOW
GLUCOSE UR STRIP-MCNC: NEGATIVE MG/DL
HGB UR QL STRIP: NEGATIVE
KETONES UR STRIP-MCNC: NEGATIVE MG/DL
LEUKOCYTE ESTERASE UR QL STRIP: ABNORMAL
NITRATE UR QL: NEGATIVE
PH UR STRIP: 6 [PH] (ref 5–8)
RBC #/AREA URNS AUTO: ABNORMAL /HPF
SP GR UR STRIP: 1.02 (ref 1–1.03)
SQUAMOUS #/AREA URNS AUTO: ABNORMAL /LPF
TRICHOMONAS, WET PREP: ABNORMAL
UROBILINOGEN UR STRIP-ACNC: 1 E.U./DL
WBC #/AREA URNS AUTO: ABNORMAL /HPF
WBC'S/HIGH POWER FIELD, WET PREP: ABNORMAL
YEAST, WET PREP: ABNORMAL

## 2022-09-26 PROCEDURE — 81001 URINALYSIS AUTO W/SCOPE: CPT

## 2022-09-26 PROCEDURE — 87210 SMEAR WET MOUNT SALINE/INK: CPT | Performed by: NURSE PRACTITIONER

## 2022-10-12 ENCOUNTER — PATIENT OUTREACH (OUTPATIENT)
Dept: FAMILY MEDICINE | Facility: CLINIC | Age: 22
End: 2022-10-12

## 2022-10-12 DIAGNOSIS — R87.612 PAPANICOLAOU SMEAR OF CERVIX WITH LOW GRADE SQUAMOUS INTRAEPITHELIAL LESION (LGSIL): ICD-10-CM

## 2022-11-26 NOTE — PROGRESS NOTES
Faxed completed and signed Physician work Status form to [facility], [phone], right fax confirmed at 2:46 pm today, 3/25/19. Copy to TC and abstracting.  Fatmata Aguilar MA/  For [employer] and [employer]     On-call note late entry: Called on 11/25/2022 at 6:24 PM requesting an Ambien refill.    Last appointment with Dr. Bustillo was 8/16/2022.    Ambien #[phone] with 2 refills sent to the pharmacy.

## 2022-12-06 ENCOUNTER — OFFICE VISIT (OUTPATIENT)
Dept: INFECTIOUS DISEASES | Facility: CLINIC | Age: 22
End: 2022-12-06
Attending: INTERNAL MEDICINE
Payer: COMMERCIAL

## 2022-12-06 VITALS
OXYGEN SATURATION: 98 % | BODY MASS INDEX: 30.11 KG/M2 | WEIGHT: 198 LBS | SYSTOLIC BLOOD PRESSURE: 146 MMHG | HEART RATE: 61 BPM | DIASTOLIC BLOOD PRESSURE: 62 MMHG

## 2022-12-06 DIAGNOSIS — Z16.30 INFECTION DUE TO DRUG-RESISTANT ORGANISM: ICD-10-CM

## 2022-12-06 DIAGNOSIS — N76.0 ACUTE VAGINITIS: Primary | ICD-10-CM

## 2022-12-06 LAB
CLUE CELLS: PRESENT
TRICHOMONAS, WET PREP: ABNORMAL
WBC'S/HIGH POWER FIELD, WET PREP: ABNORMAL
YEAST, WET PREP: ABNORMAL

## 2022-12-06 PROCEDURE — G0463 HOSPITAL OUTPT CLINIC VISIT: HCPCS

## 2022-12-06 PROCEDURE — 99215 OFFICE O/P EST HI 40 MIN: CPT | Performed by: INTERNAL MEDICINE

## 2022-12-06 PROCEDURE — 99212 OFFICE O/P EST SF 10 MIN: CPT | Performed by: INTERNAL MEDICINE

## 2022-12-06 PROCEDURE — 87210 SMEAR WET MOUNT SALINE/INK: CPT | Performed by: INTERNAL MEDICINE

## 2022-12-06 PROCEDURE — 87077 CULTURE AEROBIC IDENTIFY: CPT | Performed by: INTERNAL MEDICINE

## 2022-12-06 PROCEDURE — 87070 CULTURE OTHR SPECIMN AEROBIC: CPT | Performed by: INTERNAL MEDICINE

## 2022-12-06 NOTE — LETTER
Date:December 26, 2022      Patient was self referred, no letter generated. Do not send.        [facility] Health Information

## 2022-12-06 NOTE — Clinical Note
12/6/2022       RE: Lubna Woods  [address]  [place] [address]     Dear Colleague,    Thank you for referring your patient, Lubna Woods, to the [facility]. Please see a copy of my visit note below.      Infectious Disease Clinic Visit - seen in person      Formal note to follow.      Rosa Layton MD, MS  Infectious Disease      Again, thank you for allowing me to participate in the care of your patient.      Sincerely,    Rosa Layton MD

## 2022-12-06 NOTE — NURSING NOTE
Chief Complaint   Patient presents with     RECHECK     Blood pressure (!) 146/62, pulse 61, weight 89.8 kg (198 lb), SpO2 98 %, not currently breastfeeding.    Nura Harmon on 12/6/2022 at 3:26 PM

## 2022-12-07 NOTE — TELEPHONE ENCOUNTER
FYI to provider - Patient is lost to pap tracking follow-up. Attempts to contact pt have been made per reminder process and there has been no reply and/or no appt scheduled.       Pia Almanza RN, BSN    10/27/21 LSIL pap @ 20 yo. Plan: Pap in 1 yr.  11/2/21 Pt notified by phone.  10/12/22 Reminder MyChart  11/11/22 Reminder call. Spoke to pt  12/7/22 lost to follow up

## 2022-12-07 NOTE — PROGRESS NOTES
Infectious Disease Clinic Note    Reason for visit:  Return visit, vaginal discharge, odor, discomfort      SUBJECTIVE and Interval History:    Lubna Woods is a healthy 21 yo woman.  I treated her for a resistant Trichomonas infection in March/April 2021.  We needed a combination of oral tinidazole 2000mg nightly and intravaginal suppositories paromomycin 6.5% in 5 grams of cream.   This combination was given for 14 days.   In June 2021, she had some discomfort and I diagnosed bacterial vaginosis (BV), which was treated with metronidazole vaginal gel, as the patient's preference was to avoid more pills if she could.    Once again, Lubna feels like something is just not right with her vagina.  She started her period yesterday and noted an orange color to her discharge.  There is a strong musty odor.  There was more of an itch before she started her period.  There is usually some relief when she gets her menstrual period.    No urinary symptoms.  No pelvic pain.  No external sores. In the past, she has engaged in sex with both men and women, and prefers sex with women.   She has only had sex with women since our last visit.  Not sharing toys.    Lubna was in [place] for awhile and found it to be too big.  She is back in [place] for awhile but is also considering a move to [place].     OBJECTIVE:    Current Outpatient Medications   Medication     ibuprofen (ADVIL/MOTRIN) 400 MG tablet     No current facility-administered medications for this visit.        Physical examination:  VS: BP (!) 146/62 (BP Location: Right arm, Patient Position: Sitting, Cuff Size: Adult Regular)   Pulse 61   Wt 89.8 kg (198 lb)   SpO2 98%   BMI 30.11 kg/m      General: healthy appearing young woman, awake, alert, engaged    Pelvic exam:  Pubic hair is shaved, no areas of irritation  No lesions are noted externally, and labia and vulva appear normal  There is a couple of nodules within 1-2 cm of the introitus that Lubna notes are an  area of irritation/pruritis.  These nodules are the same color as the rest of the vaginal mucosa.  There is menstrual blood so discharge is difficult to discern.  Wet prep and swab for culture were collected by swabbing in 2-3 cm       Labs:  Reviewed prior microbiology results  6/2021 HIV, syphilis, Hep B, and Hep C screening all negative    Imaging:  None    ASSESSMENT and PLAN:    -Vaginal discharge    Wet Prep today and vaginal culture.  Given her extensive antimicrobial treatment in 2021, there is a risk for bacterial overgrowth, so I asked the microbiology lab to process a swab for culture of any predominant aerobic organisms that may be pathogenic.      Rosa Layton MD, MS  Infectious Disease    Time:  I spent 45 minutes in direct care with this patient, including 35 minutes in the room for history and exam, and 10 minutes on additional orders and care coordination in discussion with microbiology laboratory on day of service.    Office Visit on 12/06/2022   Component Date Value Ref Range Status     Culture 12/06/2022 1+ Streptococcus agalactiae (Group B Streptococcus) (A)   Final    This organism is susceptible to ampicillin, penicillin, vancomycin and the cephalosporins. If treatment is required and your patient is allergic to penicillin, contact the microbiology lab within 5 days to request susceptibility testing.     Culture 12/06/2022 1+ Normal oneyda   Final     Trichomonas 12/06/2022 Absent  Absent Final     Yeast 12/06/2022 Absent  Absent Final     Clue Cells 12/06/2022 Present (A)  Absent Final     WBCs/high power field 12/06/2022 1+ (A)  None Final       Clue cells present supporting a diagnosis of bacterial vaginosis.   Culture with Group B Strep  (one year ago also with Group B Strep and Enterococcus).  Will treat for BV but will use clindamycin gel to specifically also target the Group B Strep.

## 2022-12-07 NOTE — TELEPHONE ENCOUNTER
Attempts for follow up regarding pap smear are noted.  Patient has not scheduled and she is considered lost to follow up.  The pap smear tracking episode is closed.  A new episode may be opened should she schedule follow up testing.   Benja Palma MD

## 2022-12-10 LAB
BACTERIA SPEC CULT: ABNORMAL
BACTERIA SPEC CULT: ABNORMAL

## 2023-01-21 ENCOUNTER — HEALTH MAINTENANCE LETTER (OUTPATIENT)
Age: 23
End: 2023-01-21

## 2023-03-08 ENCOUNTER — MYC MEDICAL ADVICE (OUTPATIENT)
Dept: INFECTIOUS DISEASES | Facility: CLINIC | Age: 23
End: 2023-03-08
Payer: COMMERCIAL

## 2024-02-13 ENCOUNTER — OFFICE VISIT (OUTPATIENT)
Dept: FAMILY MEDICINE | Facility: CLINIC | Age: 24
End: 2024-02-13
Payer: COMMERCIAL

## 2024-02-13 VITALS
SYSTOLIC BLOOD PRESSURE: 126 MMHG | TEMPERATURE: 98.3 F | HEART RATE: 65 BPM | DIASTOLIC BLOOD PRESSURE: 64 MMHG | RESPIRATION RATE: 16 BRPM | BODY MASS INDEX: 29.64 KG/M2 | WEIGHT: 195.6 LBS | OXYGEN SATURATION: 100 % | HEIGHT: 68 IN

## 2024-02-13 DIAGNOSIS — B96.89 BACTERIAL VAGINOSIS: Primary | ICD-10-CM

## 2024-02-13 DIAGNOSIS — N76.0 BACTERIAL VAGINOSIS: Primary | ICD-10-CM

## 2024-02-13 PROCEDURE — 87210 SMEAR WET MOUNT SALINE/INK: CPT

## 2024-02-13 PROCEDURE — 99213 OFFICE O/P EST LOW 20 MIN: CPT

## 2024-02-13 RX ORDER — METRONIDAZOLE 500 MG/1
500 TABLET ORAL 2 TIMES DAILY
Qty: 14 TABLET | Refills: 0 | Status: SHIPPED | OUTPATIENT
Start: 2024-02-13 | End: 2024-02-20

## 2024-02-13 RX ORDER — CYCLOBENZAPRINE HCL 10 MG
10 TABLET ORAL
COMMUNITY
Start: 2024-02-02

## 2024-02-13 NOTE — PROGRESS NOTES
Assessment & Plan     (N76.0,  B96.89) Bacterial vaginosis  (primary encounter diagnosis)  Comment: Acute.  Wet prep noting clue cells is diagnostic for BV. No signs of respiratory distress, vital signs stable, and no red flag signs requiring immediate need for medical attention.  Will treat with an oral course of antibiotics with close monitoring and education follow-up if symptoms worsen or do not improve.   Plan: Wet prep - Clinic Collect    Metronidazole 500 mg twice daily for 7 days    Patient is in need of a follow-up Pap smear.  Discussed this today in clinic, and patient attested to knowing that this is needed.  Patient does not have a primary care provider.  Primary care follow-up referral placed to help patient establish care with a primary care provider.  Discussed the importance of establishing care with a PCP for ongoing management of both preventative and acute healthcare needs.  Discussed the importance of doing her follow-up Pap smear at this time.  Patient attested to understanding and verbalized that she would make this appointment prior to leaving the clinic today.    Ordering of each unique test  Prescription drug management  I spent a total of 17 minutes on the day of the visit.   Time spent by me doing chart review, history and exam, documentation and further activities per the note      FUTURE APPOINTMENTS:       - Follow-up visit in 1 to 2 weeks if symptoms worsen or do not improve       - Follow-up for annual visit or as needed  Patient Instructions   Your increased vaginal discharge may be related to number of things such as bacterial vaginosis, yeast infection, or other cause.  We have done a wet prep today to help determine the cause of this.    Will wait to treat your symptoms until we have a better idea of what is causing them.  Bacterial vaginosis is best treated with a short course of antibiotics, while yeast infection is best treated with a course of antifungal medication.    As we  discussed today, you are due for a Pap smear.  You will be very important for you to schedule this at your earliest convenience to ensure that we are keeping a close eye on your vaginal and gynecological health.    I also think it is important for you to establish care with a primary care provider.  This is a provider that you will see more consistently, so that your healthcare needs are managed by somebody who knows you well.    Please seek immediate medical attention for symptoms including chest pain, shortness of breath, abnormal or increased vaginal pain or bleeding, open sores in your vaginal area, fever, chills, or malaise.    Subjective   Lubna is a 23 year old, presenting for the following health issues:  Vaginal Problem (Sx including watery discharge- symptomatic for about 2 weeks./Had this issue in [place] August 2023./Used boric acid suppository x 3, not successful. )      2/13/2024     2:06 PM   Additional Questions   Roomed by AZUL Hunt   Lubna is a 23-year-old female with a past medical history significant for dysmenorrhea, marijuana abuse, and abnormal Pap who presents today with concerns for abnormal vaginal discharge.  Patient reports that she began having vaginal symptoms approximately 2 weeks ago which included increased watery vaginal discharge and general vaginal discomfort.  Patient declines any foul-smelling vaginal discharge, open sores or lesions in her vagina, abnormal vaginal bleeding or spotting, or concerns for STIs.  Patient's last menstrual period began today.  She declines any urinary symptoms including dysuria, blood in her urine, abdominal pain, flank pain, or fever.  She declines any changes in her bowel habits.  She declines any anemia systemic illness or upper respiratory symptoms fever, chills, malaise, cough, sore throat, headache, or general fatigue.  Patient declines any possibility for pregnancy.  She was seen in the emergency department on February 2 at which time she  "noted some concern for possible UTI.  Urinalysis at this time is clear.  Patient has had BV in the past, and feels that symptoms are largely similar to that.  When symptoms initiated she tried 3 courses of boric acid suppositories without successful treatment.      History of Present Illness       Reason for visit:  Bacteria Vag or UTI    She eats 0-1 servings of fruits and vegetables daily.She consumes 1 sweetened beverage(s) daily.She exercises with enough effort to increase her heart rate 9 or less minutes per day.  She exercises with enough effort to increase her heart rate 3 or less days per week.   She is taking medications regularly.         Review of Systems  Constitutional, HEENT, cardiovascular, pulmonary, gi and gu systems are negative, except as otherwise noted.      Objective    /64 (BP Location: Left arm, Patient Position: Sitting, Cuff Size: Adult Regular)   Pulse 65   Temp 98.3  F (36.8  C) (Tympanic)   Resp 16   Ht 1.727 m (5' 8\")   Wt 88.7 kg (195 lb 9.6 oz)   LMP 01/15/2024 (Approximate)   SpO2 100%   BMI 29.74 kg/m    Body mass index is 29.74 kg/m .  Physical Exam   GENERAL: healthy, alert and no distress  NECK: no adenopathy, no asymmetry, masses, or scars  RESP: Easy rate, depth, and effort of breathing.  No visible use of accessory muscles.  No rapid respiratory rate or increased work of breathing appreciated.  CV: peripheral edema, clubbing, or cyanosis  MS: no gross musculoskeletal defects noted, no edema  NEURO: Normal strength and tone, mentation intact and speech normal   (male): normal male genitalia without lesions or urethral discharge, no hernia      Results for orders placed or performed in visit on 02/13/24 (from the past 24 hour(s))   Wet prep - Clinic Collect    Specimen: Vagina; Swab   Result Value Ref Range    Trichomonas Absent Absent    Yeast Absent Absent    Clue Cells Present (A) Absent    WBCs/high power field 2+ (A) None       Sera Collazo, DNP " FNP-C  Family Nurse Practitioner - Same Day Provider  [facility]      Signed Electronically by: SHEA Bowie CNP

## 2024-02-18 ENCOUNTER — HEALTH MAINTENANCE LETTER (OUTPATIENT)
Age: 24
End: 2024-02-18

## 2024-04-04 ENCOUNTER — TELEPHONE (OUTPATIENT)
Dept: SURGERY | Facility: CLINIC | Age: 24
End: 2024-04-04
Payer: COMMERCIAL

## 2024-04-04 ENCOUNTER — TRANSCRIBE ORDERS (OUTPATIENT)
Dept: OTHER | Age: 24
End: 2024-04-04

## 2024-04-04 DIAGNOSIS — K61.0 PERIANAL ABSCESS: Primary | ICD-10-CM

## 2024-04-04 NOTE — TELEPHONE ENCOUNTER
[facility] Call Center    Phone Message    May a detailed message be left on voicemail: yes     Reason for Call: Other: New patient // Laura Anal Abscess // Emergent referral states to be seen by 4-8 // please call patient to assess and schedule (referral is listed for Gen Surg)      Action Taken: Message routed to:  [facility] CRS    Travel Screening: Not Applicable

## 2024-04-04 NOTE — TELEPHONE ENCOUNTER
Diagnosis, Referred by & from: F/U after I&D; perirectal abscess   Appt date: 4/11/2024   NOTES STATUS DETAILS   OFFICE NOTE from referring provider N/A    OFFICE NOTE from other specialist [facility]:  12/6/22 - ID OV with Dr. Calin Ratliff [facility]:  10/27/21 - OBGYN OV with Dr. Palma   DISCHARGE SUMMARY from hospital N/A    DISCHARGE REPORT from the ER [facility]:  4/3/24 - ED OV with Dr. Loving  9/1/22 - ED OV with Dr. Wren   OPERATIVE REPORT N/A    MEDICATION LIST Care Everywhere    LABS N/A    DIAGNOSTIC PROCEDURES N/A    IMAGING (DISC & REPORT) N/A

## 2024-04-04 NOTE — TELEPHONE ENCOUNTER
Perianal abscess triage note:     S/p I and D of perianal abscess in ED. Scheduled one week follow up

## 2024-04-11 ENCOUNTER — PRE VISIT (OUTPATIENT)
Dept: SURGERY | Facility: CLINIC | Age: 24
End: 2024-04-11

## 2024-05-29 ENCOUNTER — OFFICE VISIT (OUTPATIENT)
Dept: OBGYN | Facility: CLINIC | Age: 24
End: 2024-05-29
Payer: COMMERCIAL

## 2024-05-29 VITALS
BODY MASS INDEX: 30.71 KG/M2 | SYSTOLIC BLOOD PRESSURE: 124 MMHG | OXYGEN SATURATION: 98 % | DIASTOLIC BLOOD PRESSURE: 74 MMHG | WEIGHT: 202 LBS

## 2024-05-29 DIAGNOSIS — N89.8 VAGINAL DISCHARGE: Primary | ICD-10-CM

## 2024-05-29 DIAGNOSIS — N76.0 BV (BACTERIAL VAGINOSIS): Primary | ICD-10-CM

## 2024-05-29 DIAGNOSIS — B96.89 BV (BACTERIAL VAGINOSIS): Primary | ICD-10-CM

## 2024-05-29 LAB
ALBUMIN UR-MCNC: NEGATIVE MG/DL
APPEARANCE UR: CLEAR
BILIRUB UR QL STRIP: NEGATIVE
CLUE CELLS: PRESENT
COLOR UR AUTO: ABNORMAL
GLUCOSE UR STRIP-MCNC: NEGATIVE MG/DL
HGB UR QL STRIP: ABNORMAL
KETONES UR STRIP-MCNC: NEGATIVE MG/DL
LEUKOCYTE ESTERASE UR QL STRIP: ABNORMAL
NITRATE UR QL: NEGATIVE
PH UR STRIP: 6.5 [PH] (ref 5–7)
RBC #/AREA URNS AUTO: ABNORMAL /HPF
SKIP: ABNORMAL
SP GR UR STRIP: 1.02 (ref 1–1.03)
SQUAMOUS #/AREA URNS AUTO: ABNORMAL /LPF
TRICHOMONAS, WET PREP: ABNORMAL
UROBILINOGEN UR STRIP-MCNC: NORMAL MG/DL
WBC #/AREA URNS AUTO: ABNORMAL /HPF
WBC'S/HIGH POWER FIELD, WET PREP: ABNORMAL
YEAST, WET PREP: ABNORMAL

## 2024-05-29 PROCEDURE — 99213 OFFICE O/P EST LOW 20 MIN: CPT

## 2024-05-29 PROCEDURE — 81001 URINALYSIS AUTO W/SCOPE: CPT

## 2024-05-29 PROCEDURE — 87210 SMEAR WET MOUNT SALINE/INK: CPT

## 2024-05-29 RX ORDER — CLINDAMYCIN PHOSPHATE 20 MG/G
1 CREAM VAGINAL AT BEDTIME
Qty: 40 G | Refills: 0 | Status: SHIPPED | OUTPATIENT
Start: 2024-05-29 | End: 2024-06-05

## 2024-05-29 RX ORDER — METRONIDAZOLE 7.5 MG/G
GEL VAGINAL
Qty: 70 G | Refills: 0 | Status: SHIPPED | OUTPATIENT
Start: 2024-05-29

## 2024-05-29 NOTE — PROGRESS NOTES
Subjective:  Lubna is a 23 year old   is here today with the following concerns:    Vaginal discharge: reports recurrent BV history and yellow, odorous discharge recently. She is wanting to be tested for Group B Strep. She is sexually active with females. Not on any contraception. No pain, fevers. Interested in suppressive therapy if BV (+). She has temporarily tried vag boric acid suppositories and said they helped a little but she is unsure if she was using them correctly. She has them at home and can re-start. Declines STI testing because she reports this was very recently done at outside clinic and all negative.       ROS: Pertinent ROS as above.    Medical history  OB History    Para Term  AB Living   0 0 0 0 0 0   SAB IAB Ectopic Multiple Live Births   0 0 0 0 0      Past Medical History:   Diagnosis Date    Abnormal Pap smear of cervix 10/27/2021    Dysmenorrhea 09/10/2014    History of chlamydia 2021    Marijuana abuse 2017    Suicide attempt (H) 2020    Trichomonas vaginitis     drug resistence.  was seen by Infectious Disease.      Past Surgical History:   Procedure Laterality Date    NO HISTORY OF SURGERY         ALL/Meds: Her medication and allergy histories were reviewed and are documented in their appropriate chart areas.    SH: Reviewed and documented in the appropriate area of the chart.  FH:  Her family history is reviewed and updated in the chart, today.  PMH: Her past medical, surgical, and obstetric histories were reviewed and updated today in the appropriate chart areas.    Objective:  PE: /74 (BP Location: Right arm, Patient Position: Sitting, Cuff Size: Adult Regular)   Wt 91.6 kg (202 lb)   LMP  (LMP Unknown)   SpO2 98%   BMI 30.71 kg/m    Body mass index is 30.71 kg/m .    Pertinent Physical exam findings:    GENERAL: Active, alert, in no acute distress.  SKIN: Clear. No significant rash, abnormal pigmentation or lesions  MS: no gross  musculoskeletal defects noted, no edema  PSYCH: Age-appropriate alertness and orientation    Pelvic:       - Ext: Vulva and perineum are normal without lesion, mass or discharge        - Urethra: normal without discharge or scarring        - Bladder: no tenderness, no masses       - Vagina: with grey and milky discharge and rugated       - Cervix: normal and nulliparous     A/P:  Lubna Woods is a 23 year old  here today with the following concerns:  (N89.8) Vaginal discharge  (primary encounter diagnosis)  Comment: Counseled on bacterial vaginosis (BV). Informed that it is caused by loss of or a shift in the normal vaginal microbiota, consisting of lactobacilli, to other organisms which can release amines and increase vaginal pH.  Informed that lactobacilli are important in preventing overgrowth of anaerobic bacteria responsible for bacterial vaginosis. Informed this is not a sexually transmitted disease because there is lack of a single causative agent and that partner therapy is not warranted. Discussed that antibiotics are initial treatment but intravaginal boric acid and/or biweekly metrogel may be used for recurrent infection. Advised they start taking daily probiotic. Discussed risk-reducing strategies for BV such as condom use, abstinence, hormonal contraceptives and cessation of douching. Discussed suppressive therapy. Would prefer vaginal treatment if positive. Will treated with vaginal clindamycin cream 2% if positive and started boric acid nightly x 30 nights, followed by 4-6mo of twice weekly Metrogel if warranted. She is aware of this suppressive treatment and will do this if she has BV today.   Plan: Wet preparation, UA Macroscopic with reflex to         Microscopic and Culture          She is agreeable to the plan above and has no further questions or concerns. She declined a chaperone for the physical exam component of the visit today.     SHEA Jarquin CNP

## 2025-03-09 ENCOUNTER — HEALTH MAINTENANCE LETTER (OUTPATIENT)
Age: 25
End: 2025-03-09

## 2025-04-09 ENCOUNTER — OFFICE VISIT (OUTPATIENT)
Dept: MIDWIFE SERVICES | Facility: CLINIC | Age: 25
End: 2025-04-09

## 2025-04-09 VITALS
WEIGHT: 210 LBS | SYSTOLIC BLOOD PRESSURE: 122 MMHG | DIASTOLIC BLOOD PRESSURE: 76 MMHG | HEIGHT: 69 IN | BODY MASS INDEX: 31.1 KG/M2

## 2025-04-09 DIAGNOSIS — N94.9 PERINEAL DISCOMFORT IN FEMALE: ICD-10-CM

## 2025-04-09 DIAGNOSIS — N89.8 VAGINAL ITCHING: Primary | ICD-10-CM

## 2025-04-09 PROCEDURE — 87210 SMEAR WET MOUNT SALINE/INK: CPT | Performed by: ADVANCED PRACTICE MIDWIFE

## 2025-04-09 PROCEDURE — 99213 OFFICE O/P EST LOW 20 MIN: CPT | Performed by: ADVANCED PRACTICE MIDWIFE

## 2025-04-09 RX ORDER — CLINDAMYCIN PHOSPHATE 20 MG/G
CREAM VAGINAL AT BEDTIME
COMMUNITY

## 2025-04-09 ASSESSMENT — ANXIETY QUESTIONNAIRES
6. BECOMING EASILY ANNOYED OR IRRITABLE: SEVERAL DAYS
GAD7 TOTAL SCORE: 10
7. FEELING AFRAID AS IF SOMETHING AWFUL MIGHT HAPPEN: SEVERAL DAYS
7. FEELING AFRAID AS IF SOMETHING AWFUL MIGHT HAPPEN: SEVERAL DAYS
4. TROUBLE RELAXING: SEVERAL DAYS
GAD7 TOTAL SCORE: 10
GAD7 TOTAL SCORE: 10
IF YOU CHECKED OFF ANY PROBLEMS ON THIS QUESTIONNAIRE, HOW DIFFICULT HAVE THESE PROBLEMS MADE IT FOR YOU TO DO YOUR WORK, TAKE CARE OF THINGS AT HOME, OR GET ALONG WITH OTHER PEOPLE: VERY DIFFICULT
2. NOT BEING ABLE TO STOP OR CONTROL WORRYING: MORE THAN HALF THE DAYS
1. FEELING NERVOUS, ANXIOUS, OR ON EDGE: MORE THAN HALF THE DAYS
8. IF YOU CHECKED OFF ANY PROBLEMS, HOW DIFFICULT HAVE THESE MADE IT FOR YOU TO DO YOUR WORK, TAKE CARE OF THINGS AT HOME, OR GET ALONG WITH OTHER PEOPLE?: VERY DIFFICULT
3. WORRYING TOO MUCH ABOUT DIFFERENT THINGS: MORE THAN HALF THE DAYS
5. BEING SO RESTLESS THAT IT IS HARD TO SIT STILL: SEVERAL DAYS

## 2025-04-09 ASSESSMENT — PATIENT HEALTH QUESTIONNAIRE - PHQ9
SUM OF ALL RESPONSES TO PHQ QUESTIONS 1-9: 12
SUM OF ALL RESPONSES TO PHQ QUESTIONS 1-9: 12
10. IF YOU CHECKED OFF ANY PROBLEMS, HOW DIFFICULT HAVE THESE PROBLEMS MADE IT FOR YOU TO DO YOUR WORK, TAKE CARE OF THINGS AT HOME, OR GET ALONG WITH OTHER PEOPLE: VERY DIFFICULT

## 2025-04-09 NOTE — PATIENT INSTRUCTIONS
"Hello!       Below are some tips for ideal feminine hygiene, to avoid perineal and vaginal infections/imbalances/skin irritations.    [facility]:    Bathing and Hygiene    #1- Bathing/Cleaning  Use only water for cleaning.  If you have a removable shower head, you can direct the water straight toward your perineal/private area to clean by rinsing well with warm water.  Never douche or use soaps on your perineal area.  These wash away your body's oils, which have natural antibiotic properties, keeping the \"bad\" bacteria away and promoting \"good\" bacteria.  For the rest of your body, use a gentle non scented soap such as Dove for Sensitive Skin, Neutrogena, Basis, Aveeno, or Pears.  If you have a partner, have them use one of these soaps as well.  Do not scrub the vulvar skin with a wash cloth.  If your vulvar area is itching, burning, or otherwise irritated, try a baking soda soak.  Use lukewarm (not hot) bath water with 4-5 tablespoons of baking soda to help soothe the skin.  Soak 1-3 times a day for 10 minutes.  If you use a sitz bath, decrease baking soda to 1-2 teaspoons.  After bathing, pat the vulvar/private area dry with a towel- do not rub the skin.  Do not use lotions, gels, ointments, creams, feminine hygiene sprays, or perfumes.  These can be irritating, especially if they contain perfumes.  Small amounts of coconut oil, zinc oxide ointment, or plan petroleum jelly may be applied to your vulva to protect the skin and decrease irritation during your period or menstrual bleeding.  Do not shave with a razor, wax, or use hair removal products on the vulvar area.  You may use scissors or electric clippers to trim the pubic hair close to the vulva.  Laser hair removal is a safe option.   .  #2- Toileting/Menstruation  If urine causes burning of the skin, pour/spray lukewarm water over the vulva/urethra while urinating.  Use white, unscented toilet paper.  Do not use toilet paper with aloe or other " lotions.  Pat dry rather than wiping.  Do not use adult or baby wipes.  These can be irritating, especially if they contain perfumes.   You can use Tucks pads if needed or desired.  Witch Hazel is helpful and healing for skin tissue, and can soothe irritation.  Do not use deodorized pads or tampons.  Only use tampons when the blood flow is heavy enough to soak one tampon in four hours or less.  Wearing them too long or when the blood flow is light may result in vaginal infection, increased discharge, unpleasant odor, or toxic shock syndrome.  Use only pads with a cotton liner, such as Stayfree, Mittie, or 7th Generation.  Pads with nylon mesh weave traps moisture and keeps irritating blood and discharge against your skin longer.  Consider trying cotton (reusable) pads to see if they are less irritating.      #3- Daily care.   Keep your perineal area cool and dry as possible, as warm, wet environments favor undesirable yeast/bacteria/skin irritation.    Use cotton fabrics (underwear, pantyliners, pads) whenever possible.  Only wear 100% cotton underwear (or 100% cotton liner at a minimum) as this material provides better air circulation, allowing air in and moisture out.  Do not wear thongs.  Do not wear underwear to bed at night as this also allows for better air circulation (pajama bottoms or loose boxers are okay as they are loose and let air flow).  During the day, keep an extra pair of underwear with you, and change if you become damp.  Gold Noonan or Zeasorb may be applied to the vulva and groin area 1-2 times per day to help absorb moisture.  Do not use powders that contain cornstarch or talcum powder.  Rinse your private area off with warm water and pat dry with a towel any time the area becomes warm and damp (such as after exercise, or on a hot, humid day).  Remove wet bathing and exercise clothing as soon as you can.  Avoid tight clothing, especially made of synthetic fabrics.  Do not wear pads on a daily  "basis if possible.  Avoid pantyhose. If you must wear them, cut the katerina crotch out being sure to leave enough fabric to prevent the seam from running, or wear thigh high hose.  Dryness and irritation during intercourse can be helped by using a sterile lubricant.  Avoid water based lubricants as these tend to dry out before intercourse is over, causing small tears in the vagina and/or irritation of vulvar skin.  If needed, use coconut oil or name brand Slippery Stuff for lubrication, which will also help keep semen off the skin and decrease burning and irritation with intercourse.     #4- Birth Control  If using condoms, use ones that do not come lubricated and do not contain spermicides.  Lubricated condoms, contraceptive jellies, creams, or sponges may all cause itching and burning.  If needed, use coconut oil or name brand Slippery Stuff for lubricating dry condoms, which will also decrease burning and irritation with intercourse.  Avoid petroluem-based lubricants as these may affect the integrity of condoms and increase chance of pregnancy and sexually transmitted infections.  If desiring oral birth control pills, consider low-dose as these do not increase your chances of getting a yeast infection.    #5- Laundry  Use detergent free and clear of dyes and perfumes on all laundry that goes into your washer, every load, every time.  No substitutions.  Use 1/3-1/2 suggested amount of soap per load.  Do not use fabric softeners or sheets in the washer or dryer, even those advertised as \"free\".  If you use a shred washer/dryer such as laundromat, apartment, or dorm hand wash and line dry your underwear.  Use canseco balls to help soften clothes.  Avoid stain removing products, including bleach, especially on towels and underwear.  If you must use stain removers, soak and rinse in clear water anything which has had a stain removing product on it.  Then wash in regular washing cycle using detergent free and clear of " dyes and perfumes to remove as much of the product as possible.  White vinegar or lemon juice, 1/4-/13 cup per load of laundry, can be used to freshen clothes and remove oils.    #6- If you desire, you can try oral or vaginal probiotics.  We don't know for sure if this promotes healthy vaginal oneyda (bacteria), but it may be beneficial, and is unlikely to be harmful.  I recommend Fem Dophilis one tablet per day.    Hope this helps!    SHEA Wiley CNM   4/9/2025  10:05 AM        KEY for vaginal probiotics is variety!  I recommend changing probiotics every three months, and continuing to insert vaginally.   Better is consuming pre/probiotic foods.  The goal is to have a healthy vaginal oneyda/microbiome without need to take probiotics long term.     Good bacteria for GUT microbiome include: B. Bifidum, B longum, L.casei, A muciniphila, and C. Butyricum.     Diet has considerable influence on female health.  Pre-biotic foods that feed good bacteria include brightly colored fruits and vegetables.  Eat more plan protein than animal protein and more unsaturated fat than saturated fat.  Organic, whole, unprocessed foods  Avoid sugars and trans fats  Focus on nutrient density  Avoid alcohol and energy drinks  More high fiber carbs and legumes  More good fats (whole milk, fish, avocado, nuts)  More pH basic foods and less pH acidic foods       Cycle Tracking: options include, if interested:  Chart [place] for symptom tracking options, cost, and data security.    Best apps for avoiding pregnancy include: Ovulation [place], Sympto.org, LilyPro, Lady Cycle, mfNFP.net, NFP Charting, Fertility Pinpoint, and Kindara.  These have varying options for ios/android/web use.     Education for AVS: FABM                 There are different ways we can monitor our reproductive health and use what we know for the purposes of family planning.  Here are several methods to consider:     The Calendar Method.  This is the least accurate,  least informative, and least helpful way of charting.  Most phone apps that monitor your menstrual cycle are based on the calendar method, which only tracks the menstrual cycle from the start of one menstrual bleed to the start of the next.  It can only be used if you have regular cycles that are 26-32 days in length, and assumes days 8-19 are fertile.  Most statistics on the effectiveness for fertility awareness based methods of family planning are based on this method, and include times couples chose to have sex in the fertile time either open to or changing their mind to desire pregnancy.  This method is about 80% effective for family planning.    [place].  This method monitors vaginal bleeding/spotting, cervical/vaginal mucus and dryness.  The charting is called the  Fertility Care System  and if you keep track of your symptoms on the charts, the information can be used to provide medical and/or surgical treatment with the goal of restoring health.  As you track your cycles, you discover when you are fertile/infertile and learn more about your reproductive health/problems.  With intentional use, this method is 98% effective, or as effective as taking combined oral contraceptives.     Sympto-Thermal.  This method monitors the same things as the Yonis, and also monitors your basal body temperature.  The methods that gather this information include Sensiplan, Couple to Couple League, and SymptoPro.  A  Basal Body Temperature  is your resting temperature - you check and record it first thing every morning when you wake and before you get out of bed- even before you go to the bathroom.  As you keep track of your bleeding, temperature, and sometimes mucus, over time you learn more about your reproductive health and days you are typically fertile and infertile.  This method is also 98% effective with correct use.     Sympto-Hormonal.  This includes any method that checks your urinary hormones, such as [place].   Often they include some or all of the above symptom monitoring plus urine hormone level checks throughout your cycle.  The devices to check your hormones cost money, and provide information about your hormone levels throughout your cycle.  Clear Blue is a fertility monitor that has been studied and validated to accurately predict ovulation.  Eva is a fertility monitor that measures not just if a hormone is present or absent, but how much you have of a hormone on a given day at a specific time with urine levels correlating with blood levels, so is a convenient way to check hormones at home throughout your cycle without needing to go to a lab for multiple blood draws.  This method is 98-99% effective with correct use.

## 2025-04-09 NOTE — PROGRESS NOTES
"PROBLEM VISIT:      Subjective:  Lubna Woods is a 24 year old  who is a new patient of the [facility] CNMs.    She is primarily here today regarding vaginal/perineal discomfort.    Notes she is feeling vaginal itchiness and irritation at perineum.  Took vaginal and oral probiotic a week ago when symptoms first started, which seemed to worsen symptoms.  Then used old clinda cream at home yesterday (had been in storage in [place] for two years), itching resolved, however unsure if sx truly resolvoing.  Interested in both oral and vaginal medication. Notes in the past Monistat works well for yeast, and metronidazole works well for BV.    Aside from this,   she works nights and has difficulty sleeping.  Very fatigued at today's visit.  Notes new onset irregular cycles, past three years.  Does not track cycles.  Interested in cycle regulation.  Admits to smoking marijuana and thinking of quitting.      Objective:  /76 (BP Location: Right arm, Patient Position: Sitting, Cuff Size: Adult Large)   Ht 1.746 m (5' 8.75\")   Wt 95.3 kg (210 lb)   LMP  (LMP Unknown)   BMI 31.24 kg/m       Answers submitted by the patient for this visit:  Patient Health Questionnaire (Submitted on 2025)  If you checked off any problems, how difficult have these problems made it for you to do your work, take care of things at home, or get along with other people?: Very difficult  PHQ9 TOTAL SCORE: 12  Patient Health Questionnaire (G7) (Submitted on 2025)  TE 7 TOTAL SCORE: 10    General Exam:  Pleasant, articulate, well groomed, fatigued (leaning over/back, trouble keeping eyes open), intelligent.  Detailed exam not done.  Interested in self collection of wet prep, given instructions.      Assessment:  Vaginal itching  Perineal discomfort in female  Irregular Cycle  Marijuana Use      Plan:  Lab: Wet prep.  Treat as indicated.  Also given information on ideal feminine hygiene in case any of the tips help alleviate her " symptoms.  Information on cycle tracking given via AVS.  Also reviewed a 5% weight loss (currently about 10 lbs) will often result in cycles regulating, with physiologic reasoning. Follow up if desired after 3 cycles/ 3 months of tracking, as desired.  Discussed increasing data associated with long term marijuana use such as lung and cardiac disease, aware quitting would be in her best interest.  Will consider and quit if/when ready.  RTC in future as needed/desired.      Time spent:  Chart review/Pre-chartin min day of service  Face-to-face visit: 15 min counseling and education  Documentation: 5 min  Total time spent on day of service: 20 min  SHEA Wiley CNM   2025  2:42 PM

## 2025-04-10 ENCOUNTER — NURSE TRIAGE (OUTPATIENT)
Dept: NURSING | Facility: CLINIC | Age: 25
End: 2025-04-10

## 2025-04-10 RX ORDER — METRONIDAZOLE 7.5 MG/G
1 GEL VAGINAL 2 TIMES DAILY
Qty: 70 G | Refills: 0 | Status: SHIPPED | OUTPATIENT
Start: 2025-04-10 | End: 2025-04-17

## 2025-04-11 NOTE — TELEPHONE ENCOUNTER
Lubna is confused as to why she feels her vaginal/perineal area is irritated/inflammed since her wet prep back negative.  She's going to message her provider and ask if she'll put another order in for a repeat of the wet prep.    Lynn FLORES RN [facility]